# Patient Record
Sex: MALE | Race: BLACK OR AFRICAN AMERICAN
[De-identification: names, ages, dates, MRNs, and addresses within clinical notes are randomized per-mention and may not be internally consistent; named-entity substitution may affect disease eponyms.]

---

## 2018-01-05 ENCOUNTER — HOSPITAL ENCOUNTER (EMERGENCY)
Dept: HOSPITAL 92 - ERS | Age: 58
Discharge: HOME | End: 2018-01-05
Payer: MEDICARE

## 2018-01-05 DIAGNOSIS — Z79.82: ICD-10-CM

## 2018-01-05 DIAGNOSIS — Z79.899: ICD-10-CM

## 2018-01-05 DIAGNOSIS — I50.9: ICD-10-CM

## 2018-01-05 DIAGNOSIS — D50.9: ICD-10-CM

## 2018-01-05 DIAGNOSIS — I13.2: Primary | ICD-10-CM

## 2018-01-05 DIAGNOSIS — Z99.2: ICD-10-CM

## 2018-01-05 DIAGNOSIS — K21.9: ICD-10-CM

## 2018-01-05 DIAGNOSIS — M10.9: ICD-10-CM

## 2018-01-05 DIAGNOSIS — E78.00: ICD-10-CM

## 2018-01-05 DIAGNOSIS — E11.22: ICD-10-CM

## 2018-01-05 DIAGNOSIS — N18.6: ICD-10-CM

## 2018-01-05 LAB
ALBUMIN SERPL BCG-MCNC: 3.8 G/DL (ref 3.5–5)
ALP SERPL-CCNC: 106 U/L (ref 40–150)
ALT SERPL W P-5'-P-CCNC: 49 U/L (ref 8–55)
ANION GAP SERPL CALC-SCNC: 17 MMOL/L (ref 10–20)
ANISOCYTOSIS BLD QL SMEAR: (no result) (100X)
AST SERPL-CCNC: 74 U/L (ref 5–34)
BILIRUB SERPL-MCNC: 1.1 MG/DL (ref 0.2–1.2)
BUN SERPL-MCNC: 17 MG/DL (ref 8.4–25.7)
CALCIUM SERPL-MCNC: 9 MG/DL (ref 7.8–10.44)
CHLORIDE SERPL-SCNC: 95 MMOL/L (ref 98–107)
CK MB SERPL-MCNC: 2 NG/ML (ref 0–6.6)
CO2 SERPL-SCNC: 28 MMOL/L (ref 22–29)
CREAT CL PREDICTED SERPL C-G-VRATE: 0 ML/MIN (ref 70–130)
GLOBULIN SER CALC-MCNC: 4.2 G/DL (ref 2.4–3.5)
GLUCOSE SERPL-MCNC: 149 MG/DL (ref 70–105)
HGB BLD-MCNC: 13.7 G/DL (ref 14–18)
LIPASE SERPL-CCNC: 10 U/L (ref 8–78)
MCH RBC QN AUTO: 30.5 PG (ref 27–31)
MCV RBC AUTO: 89.3 FL (ref 80–94)
MDIFF COMPLETE?: YES
PLATELET # BLD AUTO: 185 THOU/UL (ref 130–400)
PLATELET BLD QL SMEAR: (no result)
POTASSIUM SERPL-SCNC: 3.8 MMOL/L (ref 3.5–5.1)
RBC # BLD AUTO: 4.48 MILL/UL (ref 4.7–6.1)
SODIUM SERPL-SCNC: 136 MMOL/L (ref 136–145)
TROPONIN I SERPL DL<=0.01 NG/ML-MCNC: 0.05 NG/ML (ref ?–0.03)
WBC # BLD AUTO: 8.1 THOU/UL (ref 4.8–10.8)

## 2018-01-05 PROCEDURE — 85025 COMPLETE CBC W/AUTO DIFF WBC: CPT

## 2018-01-05 PROCEDURE — 71045 X-RAY EXAM CHEST 1 VIEW: CPT

## 2018-01-05 PROCEDURE — 80053 COMPREHEN METABOLIC PANEL: CPT

## 2018-01-05 PROCEDURE — 84484 ASSAY OF TROPONIN QUANT: CPT

## 2018-01-05 PROCEDURE — 36415 COLL VENOUS BLD VENIPUNCTURE: CPT

## 2018-01-05 PROCEDURE — 93005 ELECTROCARDIOGRAM TRACING: CPT

## 2018-01-05 PROCEDURE — 82553 CREATINE MB FRACTION: CPT

## 2018-01-05 PROCEDURE — 83690 ASSAY OF LIPASE: CPT

## 2018-01-05 NOTE — RAD
PORTABLE CHEST ONE VIEW:

 

01/05/2018

 

1:03 p.m.

 

HISTORY:

Dizziness.  Shortness of breath.  Weakness.  Nausea.

 

FINDINGS:

There is elevation of the right hemidiaphragm.  The heart is enlarged.  No lobar consolidation, pneum
othorax, shiva pulmonary edema, or pleural effusion is seen.

 

POS: OFF

## 2018-01-06 NOTE — EKG
Test Reason : DIFFBREATHING

Blood Pressure : ***/*** mmHG

Vent. Rate : 081 BPM     Atrial Rate : 081 BPM

   P-R Int : 174 ms          QRS Dur : 082 ms

    QT Int : 446 ms       P-R-T Axes : 044 020 029 degrees

   QTc Int : 518 ms

 

Normal sinus rhythm

Prolonged QT

Abnormal ECG

 

Confirmed by BOBBI VILLASENOR, MITESH ([id number]),  CINDI ESPINOZA ([id number]) on 1/6/2018 3:38:37 PM

 

Referred By:             Confirmed By:MITESH MARTINES MD

## 2018-10-10 ENCOUNTER — HOSPITAL ENCOUNTER (INPATIENT)
Dept: HOSPITAL 92 - ERS | Age: 58
LOS: 6 days | Discharge: HOME | DRG: 291 | End: 2018-10-16
Attending: INTERNAL MEDICINE | Admitting: INTERNAL MEDICINE
Payer: MEDICARE

## 2018-10-10 VITALS — BODY MASS INDEX: 37 KG/M2

## 2018-10-10 DIAGNOSIS — E11.21: ICD-10-CM

## 2018-10-10 DIAGNOSIS — J96.01: ICD-10-CM

## 2018-10-10 DIAGNOSIS — R53.81: ICD-10-CM

## 2018-10-10 DIAGNOSIS — Z95.5: ICD-10-CM

## 2018-10-10 DIAGNOSIS — M19.90: ICD-10-CM

## 2018-10-10 DIAGNOSIS — E78.5: ICD-10-CM

## 2018-10-10 DIAGNOSIS — I13.0: Primary | ICD-10-CM

## 2018-10-10 DIAGNOSIS — I25.10: ICD-10-CM

## 2018-10-10 DIAGNOSIS — K21.9: ICD-10-CM

## 2018-10-10 DIAGNOSIS — F32.9: ICD-10-CM

## 2018-10-10 DIAGNOSIS — E87.6: ICD-10-CM

## 2018-10-10 DIAGNOSIS — Z79.82: ICD-10-CM

## 2018-10-10 DIAGNOSIS — I50.33: ICD-10-CM

## 2018-10-10 DIAGNOSIS — I08.1: ICD-10-CM

## 2018-10-10 DIAGNOSIS — Z99.2: ICD-10-CM

## 2018-10-10 DIAGNOSIS — K59.09: ICD-10-CM

## 2018-10-10 DIAGNOSIS — M10.9: ICD-10-CM

## 2018-10-10 DIAGNOSIS — N18.6: ICD-10-CM

## 2018-10-10 DIAGNOSIS — E11.22: ICD-10-CM

## 2018-10-10 DIAGNOSIS — D50.9: ICD-10-CM

## 2018-10-10 DIAGNOSIS — Z79.899: ICD-10-CM

## 2018-10-10 DIAGNOSIS — J18.9: ICD-10-CM

## 2018-10-10 DIAGNOSIS — B19.20: ICD-10-CM

## 2018-10-10 LAB
ALBUMIN SERPL BCG-MCNC: 3.8 G/DL (ref 3.5–5)
ALP SERPL-CCNC: 64 U/L (ref 40–150)
ALT SERPL W P-5'-P-CCNC: 7 U/L (ref 8–55)
ANION GAP SERPL CALC-SCNC: 12 MMOL/L (ref 10–20)
AST SERPL-CCNC: 16 U/L (ref 5–34)
BASOPHILS # BLD AUTO: 0 THOU/UL (ref 0–0.2)
BASOPHILS NFR BLD AUTO: 1 % (ref 0–1)
BILIRUB SERPL-MCNC: 1.6 MG/DL (ref 0.2–1.2)
BUN SERPL-MCNC: 7 MG/DL (ref 8.4–25.7)
CALCIUM SERPL-MCNC: 9.1 MG/DL (ref 7.8–10.44)
CHLORIDE SERPL-SCNC: 96 MMOL/L (ref 98–107)
CK MB SERPL-MCNC: 1 NG/ML (ref 0–6.6)
CK SERPL-CCNC: 123 U/L (ref 30–200)
CO2 SERPL-SCNC: 33 MMOL/L (ref 22–29)
CREAT CL PREDICTED SERPL C-G-VRATE: 0 ML/MIN (ref 70–130)
EOSINOPHIL # BLD AUTO: 0.4 THOU/UL (ref 0–0.7)
EOSINOPHIL NFR BLD AUTO: 11.1 % (ref 0–10)
GLOBULIN SER CALC-MCNC: 3.9 G/DL (ref 2.4–3.5)
GLUCOSE SERPL-MCNC: 93 MG/DL (ref 70–105)
HGB BLD-MCNC: 10.2 G/DL (ref 14–18)
LYMPHOCYTES # BLD: 0.8 THOU/UL (ref 1.2–3.4)
LYMPHOCYTES NFR BLD AUTO: 21 % (ref 21–51)
MCH RBC QN AUTO: 28 PG (ref 27–31)
MCV RBC AUTO: 86.1 FL (ref 78–98)
MONOCYTES # BLD AUTO: 0.3 THOU/UL (ref 0.11–0.59)
MONOCYTES NFR BLD AUTO: 7.7 % (ref 0–10)
NEUTROPHILS # BLD AUTO: 2.4 THOU/UL (ref 1.4–6.5)
NEUTROPHILS NFR BLD AUTO: 59.1 % (ref 42–75)
PLATELET # BLD AUTO: 242 THOU/UL (ref 130–400)
POTASSIUM SERPL-SCNC: 2.7 MMOL/L (ref 3.5–5.1)
RBC # BLD AUTO: 3.66 MILL/UL (ref 4.7–6.1)
SODIUM SERPL-SCNC: 138 MMOL/L (ref 136–145)
TROPONIN I SERPL DL<=0.01 NG/ML-MCNC: 0.02 NG/ML (ref ?–0.03)
WBC # BLD AUTO: 4 THOU/UL (ref 4.8–10.8)

## 2018-10-10 PROCEDURE — 80400 ACTH STIMULATION PANEL: CPT

## 2018-10-10 PROCEDURE — 87086 URINE CULTURE/COLONY COUNT: CPT

## 2018-10-10 PROCEDURE — 36415 COLL VENOUS BLD VENIPUNCTURE: CPT

## 2018-10-10 PROCEDURE — 84484 ASSAY OF TROPONIN QUANT: CPT

## 2018-10-10 PROCEDURE — 80202 ASSAY OF VANCOMYCIN: CPT

## 2018-10-10 PROCEDURE — G0257 UNSCHED DIALYSIS ESRD PT HOS: HCPCS

## 2018-10-10 PROCEDURE — 81001 URINALYSIS AUTO W/SCOPE: CPT

## 2018-10-10 PROCEDURE — 83880 ASSAY OF NATRIURETIC PEPTIDE: CPT

## 2018-10-10 PROCEDURE — 80048 BASIC METABOLIC PNL TOTAL CA: CPT

## 2018-10-10 PROCEDURE — 87040 BLOOD CULTURE FOR BACTERIA: CPT

## 2018-10-10 PROCEDURE — 80053 COMPREHEN METABOLIC PANEL: CPT

## 2018-10-10 PROCEDURE — 5A1D70Z PERFORMANCE OF URINARY FILTRATION, INTERMITTENT, LESS THAN 6 HOURS PER DAY: ICD-10-PCS | Performed by: INTERNAL MEDICINE

## 2018-10-10 PROCEDURE — 71045 X-RAY EXAM CHEST 1 VIEW: CPT

## 2018-10-10 PROCEDURE — 82553 CREATINE MB FRACTION: CPT

## 2018-10-10 PROCEDURE — 96365 THER/PROPH/DIAG IV INF INIT: CPT

## 2018-10-10 PROCEDURE — 90935 HEMODIALYSIS ONE EVALUATION: CPT

## 2018-10-10 PROCEDURE — 93005 ELECTROCARDIOGRAM TRACING: CPT

## 2018-10-10 PROCEDURE — 85025 COMPLETE CBC W/AUTO DIFF WBC: CPT

## 2018-10-10 PROCEDURE — 93798 PHYS/QHP OP CAR RHAB W/ECG: CPT

## 2018-10-10 PROCEDURE — 36416 COLLJ CAPILLARY BLOOD SPEC: CPT

## 2018-10-10 PROCEDURE — 93306 TTE W/DOPPLER COMPLETE: CPT

## 2018-10-10 RX ADMIN — HEPARIN SODIUM SCH UNITS: 5000 INJECTION, SOLUTION INTRAVENOUS; SUBCUTANEOUS at 21:07

## 2018-10-10 NOTE — HP
DATE OF ADMISSION:  10/10/2018

 

CHIEF COMPLAINT:  Generalized weakness and shortness of breath.

 

PRIMARY CARE PHYSICIAN:  Emilia.

 

PRIMARY NEPHROLOGIST:  Dr. Posadas.

 

HISTORY OF PRESENTING ILLNESS:  Mr. Scott is a very pleasant 58-year-old male with known history of en
d-stage renal disease on hemodialysis as well as history of chronic diastolic congestive heart failur
e, chronic hepatitis C, iron deficiency anemia, hypertension, and diabetes mellitus who presented to 
the ER from dialysis center with the above-mentioned complaint.  History is mainly obtained by the christina molina himself and electronic medical records have been reviewed.  [facility]

 

According to Mr. Scott, he has been feeling poorly for the last 3 weeks with increased fatigue and pro
gressively worsening shortness of breath.  He has been having some subjective fevers with chills.  He
 has a dry cough.  Denies any chest pain, orthopnea or PND.  Denies any swelling.  Denies any nausea,
 vomiting, diarrhea.  He has been compliant with his dialysis and medications.  He denies any sick co
ntacts.  He is normally able to ambulate freely in the house and is independent with his ADLs and IAD
Ls, but lately has been having a hard time walking around the house because of excessive weakness.  HUMPHREY alan did receive his influenza vaccination earlier this month.

 

In the hemodialysis center today, he was found to be hypoxic with oxygen saturation in low 80s, so he
 was sent to the emergency room.  His oxygen saturation in the ER was 94% on room air.  A chest x-ray
 done in the ER was suggestive of possible bibasilar atelectasis versus pneumonia and some fluid over
load.  His BNP was elevated to 2642.  Cardiac enzymes were negative.  He was started on empiric IV an
tibiotics and is now being admitted for further evaluation and care of acute hypoxic respiratory fail
ure with possible pneumonia and fluid overload.  His potassium was also found to be low at 2.7.

 

PAST MEDICAL HISTORY:

1.  End-stage renal disease on hemodialysis started last year in 2017.

2.  Hypertension.

3.  Dyslipidemia.

4.  Diabetes mellitus type 2.

5.  Chronic diastolic congestive heart failure.

6.  Chronic hepatitis C.

7.  Gout.

 

PAST SURGICAL HISTORY:  Angioplasty and cataracts.

 

PSYCHIATRIC HISTORY:  None are reviewed with the patient.

 

SOCIAL HISTORY:  He lives alone and is independent.  No history of drug, tobacco or alcohol abuse.  Q
uit smoking 30 years ago.

 

FAMILY HISTORY:  Hypertension and coronary artery disease.

 

ALLERGIES:  No known medication allergies.

 

HOME MEDICATIONS:  As listed in the [employer], aspirin 81 mg daily, allopurinol 300 mg daily, nifedipi
ne 90 mg daily, Imdur 60 mg p.o. b.i.d., Lantus 8 units in the morning, ferrous sulfate 325 mg daily,
 Nexium 40 mg daily, Coreg 25 t.i.d., Lipitor 10 at bedtime, hydralazine 25 t.i.d., MiraLax daily, an
d omega 3 fish oil daily.

 

REVIEW OF SYSTEMS:  A 12-point review of systems is done, it is negative except for those mentioned i
n the history and physical.

 

LABORATORY DATA AND IMAGING DATA:  CBC shows WBCs at 4.0 without any left shift or bandemia, hemoglob
in 10.2, platelet count of 242.  Serum chemistry shows potassium of 2.7, chloride low at 96, bicarbon
ate high at 33, BUN 7, creatinine 2.19.  Troponin 0.017 with normal CK-MB, BNP 2642.  Chest x-ray by 
my review showed bibasilar opacities consistent with possible development of early pneumonia as well 
as mild pulmonary vascular congestion.

 

PHYSICAL EXAMINATION:

VITAL SIGNS:  Most recent vital signs; temperature 97.3, pulse of 63, respirations 20, saturating 92%
 on 2 liters nasal cannula, blood pressure 183/65.

GENERAL:  No acute distress.  He does appear somewhat sick and weak.

HEENT:  Mucous membrane is moist and pink.  No oropharyngeal exudate or erythema.  Head is normocepha
lic, atraumatic.  Pupils equal, reactive to light and accommodation.  Extraocular movement intact.

NECK:  Supple without any lymphadenopathy, JVD or bruit.

CHEST:  Clear to auscultation except for some few bibasilar crackles.  Rate and rhythm is regular wit
hout any murmur, rubs or gallops.

ABDOMEN:  Soft, nontender, nondistended with positive bowel sounds.

EXTREMITIES:  Free of any cyanosis, clubbing, or edema.

NEUROLOGIC:  Examination is nonfocal.

SKIN:  Free of any rashes or bruises.  Feels warm and dry to touch.

PSYCHIATRIC:  Normal affect.

 

IMPRESSION AND PLAN:

1.  Acute hypoxic respiratory failure.  Appears multifactorial at this time.  Pneumonia cannot be rul
ed out as well as acute exacerbation of chronic diastolic heart failure even though the patient was c
ompliant with his hemodialysis.  We will start him on empiric antibiotics.  Unfortunately, he has los
t IV access and despite multiple attempts, it was unsuccessful to be established.  It will be tried a
gain tomorrow.  He will be started and continued on empiric oral antibiotics in the meanwhile for kenny
al adjusted dose.  Blood cultures have been sent from the emergency room, and we will follow the resu
lts.  Cardiac enzyme is unremarkable, so suspicion of ACS is very low at this time.  Hemodialysis wit
h fluid removal will be done as tolerated.  We will check BMP again in the morning.  Repeat echocardi
ogram will be ordered as well.  His last echo was done in 01/2016, which showed a large pleural effus
ion, normal left ventricular systolic function, moderate tricuspid and mitral regurgitation.  He will
 be treated with symptomatic and supportive care with supplemental oxygen as well.

2.  Hypokalemia, it will be replaced and rechecked.  Dialysis supplementation as per nephrologist aft
erwards

3.  End-stage renal disease on hemodialysis.  We will consult Nephrology for maintenance hemodialysis
 in the hospital.

4.  History of hypertension.  We will restart his Procardia, carvedilol, hydralazine and Imdur and mo
nitor symptoms.  P.r.n. antihypertensives.

5.  Diabetes mellitus type 2.  Restart his Lantus and add insulin sliding scale with frequent Accu-Ch
eks.

6.  Generalized weakness.  We will have OT/PT to evaluate him.

7.  Dyslipidemia.  We will restart his Lipitor.

8.  History of chronic hepatitis C.

9.  Chronic constipation.  Restart MiraLax.

10.  Deep venous thrombosis and gastrointestinal prophylaxis.

11.  P.r.n. medications.

 

DISPOSITION:  Mr. Scott is currently being admitted to the hospital with acute hypoxic respiratory altagracia
lure likely secondary to pneumonia and fluid overload from acute on chronic diastolic congestive hear
t failure.  Estimated length of stay at this time is at least 2-3 midnights.  Further management will
 depend upon his clinical course.

## 2018-10-10 NOTE — RAD
RADIOGRAPH CHEST 1 VIEW:

 

Date:  10/10/2018

Time:  9:22 a.m.

 

HISTORY:

A 58-year-old male with dyspnea and malaise.

 

COMPARISON:

01/05/2018

 

FINDINGS:

Mild cardiomegaly.  Nonspecific mild pulmonary densities, especially at the lower lung zones and jeni
cially in the retrocardiac portion of the left lower lobe.  The attenuation of the left lower lobe ap
pears to be somewhat greater than on the prior study.  No pneumothorax.  There is a new finding of pa
rtial silhouetting of the left hemidiaphragm and blunting of the left lateral costophrenic angle.

 

IMPRESSION:

1.  Nonspecific mildly increased attenuation in the bilateral lower lung zones, which could be mild p
ulmonary interstitial edema.

 

2.  Increased attenuation in the left lower lobe, which could be mild atelectasis or early pneumonia.


 

3.  Suspected small left pleural effusion.

 

LINDY []

 

POS: KATALINA

## 2018-10-11 LAB
ANION GAP SERPL CALC-SCNC: 14 MMOL/L (ref 10–20)
BASOPHILS # BLD AUTO: 0 THOU/UL (ref 0–0.2)
BASOPHILS NFR BLD AUTO: 0.4 % (ref 0–1)
BUN SERPL-MCNC: 14 MG/DL (ref 8.4–25.7)
CALCIUM SERPL-MCNC: 8.9 MG/DL (ref 7.8–10.44)
CHLORIDE SERPL-SCNC: 98 MMOL/L (ref 98–107)
CO2 SERPL-SCNC: 25 MMOL/L (ref 22–29)
CREAT CL PREDICTED SERPL C-G-VRATE: 32 ML/MIN (ref 70–130)
CRYSTAL-AUWI FLAG: 0.7 (ref 0–15)
EOSINOPHIL # BLD AUTO: 0.6 THOU/UL (ref 0–0.7)
EOSINOPHIL NFR BLD AUTO: 16 % (ref 0–10)
GLUCOSE SERPL-MCNC: 102 MG/DL (ref 70–105)
HEV IGM SER QL: 4.7 (ref 0–7.99)
HGB BLD-MCNC: 10.1 G/DL (ref 14–18)
HYALINE CASTS #/AREA URNS LPF: (no result) LPF
LYMPHOCYTES # BLD: 0.8 THOU/UL (ref 1.2–3.4)
LYMPHOCYTES NFR BLD AUTO: 20.9 % (ref 21–51)
MCH RBC QN AUTO: 28.8 PG (ref 27–31)
MCV RBC AUTO: 86.6 FL (ref 78–98)
MONOCYTES # BLD AUTO: 0.2 THOU/UL (ref 0.11–0.59)
MONOCYTES NFR BLD AUTO: 5.5 % (ref 0–10)
NEUTROPHILS # BLD AUTO: 2.2 THOU/UL (ref 1.4–6.5)
NEUTROPHILS NFR BLD AUTO: 57.1 % (ref 42–75)
PATHC CAST-AUWI FLAG: 1.16 (ref 0–2.49)
PLATELET # BLD AUTO: 207 THOU/UL (ref 130–400)
POTASSIUM SERPL-SCNC: 4.1 MMOL/L (ref 3.5–5.1)
PROT UR STRIP.AUTO-MCNC: 300 MG/DL
RBC # BLD AUTO: 3.51 MILL/UL (ref 4.7–6.1)
RBC UR QL AUTO: (no result) HPF (ref 0–3)
SODIUM SERPL-SCNC: 133 MMOL/L (ref 136–145)
SP GR UR STRIP: 1.02 (ref 1–1.04)
SPERM-AUWI FLAG: 0 (ref 0–9.9)
WBC # BLD AUTO: 3.8 THOU/UL (ref 4.8–10.8)
WBC UR QL AUTO: (no result) HPF (ref 0–3)
YEAST-AUWI FLAG: 0 (ref 0–25)

## 2018-10-11 RX ADMIN — ASPIRIN SCH MG: 81 TABLET ORAL at 08:13

## 2018-10-11 RX ADMIN — INSULIN GLARGINE SCH MLS: 100 INJECTION, SOLUTION SUBCUTANEOUS at 08:11

## 2018-10-11 RX ADMIN — NIFEDIPINE SCH MG: 90 TABLET, EXTENDED RELEASE ORAL at 08:11

## 2018-10-11 RX ADMIN — HEPARIN SODIUM SCH UNITS: 5000 INJECTION, SOLUTION INTRAVENOUS; SUBCUTANEOUS at 08:14

## 2018-10-11 RX ADMIN — HEPARIN SODIUM SCH UNITS: 5000 INJECTION, SOLUTION INTRAVENOUS; SUBCUTANEOUS at 21:01

## 2018-10-11 NOTE — PRG
DATE OF SERVICE:  10/11/2018

 

SUBJECTIVE:  Mr. Scott is a 58-year-old black male who was admitted for generalized weakness.  Accordi
ng to the patient, he has been feeling tired for the last 2-3 weeks.  He has essentially no energy.  
In addition, his appetite is much decreased.  He was admitted for possible sepsis.  He has been empir
ically treated also with IV antibiotics.  A cardiac echo has also been ordered.  Denies any overt josué
rtness of breath.

 

Chest x-ray did suggest some fluid as well as an elevated BNP.

 

PHYSICAL EXAMINATION: 

VITAL SIGNS:  Blood pressure is 185/83, heart rate 75, respiratory rate 20, temperature 98, pulse ox 
91%.

GENERAL:  Noted to be awake, alert, but somewhat lethargic, not in overt distress.

SKIN:  Adequate turgor. 

HEENT:  He has slightly pale conjunctivae, anicteric sclerae.

NECK:  No neck mass, no carotid bruits, no JVD.

CHEST:  No deformities.

LUNGS:  Clear.  Decreased breath sounds.

HEART:  Normal sinus rhythm.  No murmur, no gallops, no rubs.

ABDOMEN:  Globular, soft, nontender, no masses.

EXTREMITIES:  No edema, no deformities.

 

MEDICATIONS:  10/11/2018 - Reviewed.

 

LABORATORY:  10/11/2018 -  Sodium 133, potassium 4.1, chloride 98, carbon dioxide 25, BUN 14, creatin
ine 3.25, glucose 102, calcium 8.9.  White count 3.8, hemoglobin 10.1. 

 

ASSESSMENT AND PLAN:  

1.  Generalized weakness/elevated BNP - cardiac echo ordered.  We will recheck what the ejection frac
tion is.  The generalized weakness may be from underlying decreased EF

2.  End-stage renal disease, stable.  We will continue current hemodialysis regimen Monday, Wednesday
, Friday.  No indication for any emergent dialysis today.

3.  Borderline anemia.  We will continue to observe.  Please note we will order a Cortrosyn stimulati
on test for this patient.

 

Recheck base met and CBC in the a.m.

## 2018-10-11 NOTE — PDOC.PN
- Subjective


Encounter Start Date: 10/11/18


Encounter Start Time: 09:00





Pt seen for followup re; acute hypoxic respiratory failure.  Feels slightly 

better, but still weak.  





- Objective


Vital Signs & Weight: 


 Vital Signs (12 hours)











  Temp Pulse Resp BP Pulse Ox


 


 10/11/18 17:00  98.0 F  74  20  144/66 H  97


 


 10/11/18 15:33   69   


 


 10/11/18 11:43  98.3 F  69  20  186/71 H  94 L


 


 10/11/18 08:12   66   


 


 10/11/18 08:11   66   


 


 10/11/18 08:00  98.0 F  75  20  185/83 H  94 L








 Weight











Admit Weight                   204 lb 9 oz


 


Weight                         204 lb 9 oz














I&O: 


 











 10/10/18 10/11/18 10/12/18





 06:59 06:59 06:59


 


Intake Total  840 240


 


Output Total  50 


 


Balance  790 240











Result Diagrams: 


 10/11/18 04:12





 10/11/18 04:12


Additional Labs: 


 Accuchecks











  10/11/18 10/11/18 10/11/18





  17:03 11:40 05:25


 


POC Glucose  82  92  105














  10/10/18





  21:03


 


POC Glucose  100














Phys Exam





- Physical Examination


Obese


HEENT: moist MMs, sclera anicteric, oral pharynx no lesions, 2+ tonsils


Neck: no nodes, no JVD, supple, full ROM


Respiratory: no wheezing, no rales, no rhonchi, clear to auscultation bilateral


Cardiovascular: RRR, no rub


S1, s2


Gastrointestinal: soft, non-tender, no distention, positive bowel sounds


Neurological: moves all 4 limbs


Psychiatric: normal affect





Dx/Plan


(1) Acute respiratory failure with hypoxia


Code(s): J96.01 - ACUTE RESPIRATORY FAILURE WITH HYPOXIA   Status: Acute   

Comment: Slightly improved, continues to be on supplemental oxygen.  Suspected 

sepsis, continue antibiotics as below and follow cultures.   





(2) ESRD (end stage renal disease) on dialysis


Code(s): N18.6 - END STAGE RENAL DISEASE; Z99.2 - DEPENDENCE ON RENAL DIALYSIS 

  Status: Chronic   Comment: dialysis per nephrology service   





(3) Gout


Code(s): M10.9 - GOUT, UNSPECIFIED   Status: Chronic   


Qualifiers: 


   Gout site: unspecified site   Gout etiology: unspecified cause   Chronicity: 

chronic   Presence of tophus: without tophus   Qualified Code(s): M1A.9XX0 - 

Chronic gout, unspecified, without tophus (tophi)   


Comment: stable   





(4) Hypertension


Code(s): I10 - ESSENTIAL (PRIMARY) HYPERTENSION   Status: Chronic   Comment: 

start PRN IV hydralazine for blood pressure spikes   





(5) Chronic hepatitis C


Code(s): B18.2 - CHRONIC VIRAL HEPATITIS C   Status: Chronic   Comment: stable 

  





- Plan





* .








Review of Systems





- Review of Systems


Constitutional: chills, weakness.  negative: fever, sweats, malaise


Respiratory: Cough, Dry, SOB with Excertion.  negative: Shortness of Breath, 

Hemoptysis, Pleuritic Pain, Sputum, Wheezing


Cardiovascular: negative: chest pain, palpitations, orthopnea, paroxysmal 

nocturnal dyspnea, edema, light headedness


Gastrointestinal: negative: Nausea, Vomiting, Abdominal Pain, Diarrhea, 

Constipation, Melena, Hematochezia


Genitourinary: negative: Dysuria, Frequency, Incontinence, Hematuria, Retention


Skin: negative: Rash, Lesions, Jae, Bruising





- Medications/Allergies


Allergies/Adverse Reactions: 


 Allergies











Allergy/AdvReac Type Severity Reaction Status Date / Time


 


No Known Drug Allergies Allergy   Verified 11/02/15 08:35











Medications: 


 Current Medications





Acetaminophen (Tylenol)  650 mg PO Q4H PRN


   PRN Reason: Headache/Fever/Mild Pain (1-3)


Hydrocodone Bitart/Acetaminophen (Norco 5/325)  1 tab PO Q4H PRN


   PRN Reason: Moderate Pain (4-6)


   Last Admin: 10/10/18 15:23 Dose:  1 tab


Allopurinol (Zyloprim)  300 mg PO DAILY [facility]


   Last Admin: 10/11/18 08:12 Dose:  300 mg


Aspirin (Ecotrin)  81 mg PO DAILY [facility]


   Last Admin: 10/11/18 08:13 Dose:  81 mg


Atorvastatin Calcium (Lipitor)  10 mg PO HS [facility]


   Last Admin: 10/10/18 21:07 Dose:  10 mg


Benzonatate (Tessalon)  100 mg PO Q4H PRN


   PRN Reason: Cough


Bisacodyl (Dulcolax)  10 mg PO DAILYPRN PRN


   PRN Reason: Constipation


Bisacodyl (Dulcolax)  10 mg [place] DAILYPRN PRN


   PRN Reason: Constipation


Calcium Carbonate (Tums)  1,000 mg PO Q4H PRN


   PRN Reason: Heartburn  or Indigestion


Carvedilol (Coreg)  25 mg PO TID [facility]


   Last Admin: 10/11/18 15:33 Dose:  25 mg


Clonidine (Catapres)  0.1 mg PO Q4H PRN


   PRN Reason: Systolic BP > 160


Cosyntropin (Cortrosyn)  250 mcg SLOW IVP WILLCALL [facility]


   Last Admin: 10/11/18 15:32 Dose:  250 mcg


Doxycycline Hyclate (Vibramycin)  100 mg PO BID [facility]


   Last Admin: 10/11/18 08:11 Dose:  100 mg


Famotidine (Pepcid)  20 mg PO DAILY [facility]


   Last Admin: 10/11/18 08:12 Dose:  20 mg


[facility]errous Sulfate (Feosol)  [facility]


   Last Admin: 10/11/18 08:12 Dose:  325 mg


Fish Oil (Fish Oil)  1,000 mg PO BID [facility]


   Last Admin: 10/11/18 08:13 Dose:  1,000 mg


Guaifenesin (Robitussin Sf)  200 mg PO Q4H PRN


   PRN Reason: Cough


Heparin Sodium (Porcine) (Heparin)  5,000 units SC BID [facility]


   Last Admin: 10/11/18 08:14 Dose:  5,000 units


Hydralazine HCl (Apresoline)  10 mg SLOW IVP Q4H PRN


   PRN Reason: Systolic BP > 170


Hydralazine HCl (Apresoline)  25 mg PO TID [facility]


   Last Admin: 10/11/18 15:33 Dose:  25 mg


Vancomycin HCl 1.25 gm/ Sodium (Chloride)  250 mls @ 166.667 mls/hr IVPB 

WILLCALL [facility]


Vancomycin HCl 1 gm/ Device  200 mls @ 200 mls/hr IVPB WILLCALL [facility]


Vancomycin HCl 750 mg/ Sodium (Chloride)  250 mls @ 250 mls/hr [facility]


Insulin Glargine 8 units/ (Miscellaneous Medication)  0.08 mls @ 0 mls/hr SC 

QAM [facility]


   Last Admin: 10/11/18 08:11 Dose:  0.08 mls


Isosorbide Mononitrate (Imdur)  60 mg PO BID [facility]


   Last Admin: 10/11/18 08:13 Dose:  60 mg


Levofloxacin (Levaquin)  250 mg PO 0600 [facility]


   Last Admin: 10/11/18 05:37 Dose:  250 mg


Loratadine (Claritin)  10 mg PO DAILYPRN PRN


   PRN Reason: Sinus Symptoms


Miscellaneous Medication (Pharmacy To Dose)  1 each IVPB PRN PRN


   PRN Reason: Pharmacy to dose


Nifedipine (Procardia Xl)  90 mg PO DAILY [facility]


   Last Admin: 10/11/18 08:11 Dose:  90 mg


Nitroglycerin (Nitrostat)  0.4 mg SL Q5MIN PRN


   PRN Reason: Chest Pain


Hold Vancomycin For (Level >20)  0 each FS .AT DIALYSIS [facility]


Ondansetron HCl (Zofran)  4 mg IVP Q6H PRN


   PRN Reason: Nausea/Vomiting


Pantoprazole Sodium (Protonix)  40 mg PO DAILY [facility]


   Last Admin: 10/11/18 08:12 Dose:  40 mg


Polyethylene Glycol (Miralax)  17 gm PO DAILY [facility]


   Last Admin: 10/11/18 08:11 Dose:  17 gm


Senna (Senokot)  2 tab PO HSPRN PRN


   PRN Reason: Constipation


Senna/Docusate Sodium (Senokot S)  2 tab PO BID PRN


   PRN Reason: Constipation


Sodium Chloride (Flush - Normal Saline)  10 ml IVF PRN PRN


   PRN Reason: Saline Flush


Tramadol HCl (Ultram)  50 mg PO Q4H PRN


   PRN Reason: Moderate Pain (4-6)

## 2018-10-11 NOTE — CON
DATE OF CONSULTATION:  10/10/2018

 

HISTORY OF PRESENT ILLNESS:  Mr. Scott is a 58-year-old black male with ESRD from diabetic nephropathy
, on maintenance hemodialysis, and admitted for generalized weakness.  The patient was undergoing shabana
lysis, complaining of generalized weakness and mild shortness of breath.  A CBC was checked and his h
emoglobin was noted to be acceptable.  He was seen today for further management.  The patient was com
plaining of some chills at home.  For that reason, he was admitted for possible sepsis.

 

Chest x-ray was suggestive of possible pneumonia versus fluid overload.  His BNP was elevated 2642.  
He had also a low potassium, but this is a post-dialysis number.

 

REVIEW OF SYSTEMS:  Positive for generalized malaise.  Positive for chills, but no fever, no diarrhea
, no constipation, no dysuria, no urinary frequency, no syncopal episode, no productive cough.  Appet
ite and energy level is decreased.

 

MEDICATIONS:  The patient is currently on Norco 5/325 q.6 hours p.r.n., Zyloprim 300 mg daily, aspiri
n 81 mg tab daily, Lipitor 10 mg at bedtime, Tessalon 100 mg q.4 hours, Tums 1000 mg q.4 hours, Coreg
 25 mg p.o. t.i.d., clonidine p.r.n., doxycycline 100 mg p.o. b.i.d., ferrous sulfate 325 mg q.a.m., 
fish oil 1000 mg p.o. b.i.d., heparin 5000 units subcutaneously b.i.d., insulin glargine 8 units subc
u q.a.m., Imdur 60 mg p.o. b.i.d., Levaquin 250 mg daily, nifedipine 90 mg XL tab daily, Nitrostat p.
r.n., status post vancomycin, Protonix 40 mg tab once a day.

 

PAST MEDICAL HISTORY:

1.  Patient has history of ESRD from diabetic nephropathy.  Had been currently on maintenance hemodia
lysis.

2.  He has type 2 diabetes mellitus.

3.  Status post CHF.

4.  Coronary artery disease.

5.  Chronic anemia.

6.  Hyperlipidemia.

7.  Gout.

8.  Hypertension.

9.  GERD.

10.  Depression.

 

PAST SURGICAL HISTORY:

1.  Status post AV fistula placement.

2.  Status post cuffed hemodialysis catheter placement.

3.  Status post cardiac catheterization with coronary stent placement.

4.  Status post colonoscopy.

5.  Status post eye surgery.

 

SOCIAL HISTORY:  The patient lives in [place].  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  He is original
ly from [place].  He has 6 children.  Education:  GED.  Smoked for 20 years, one pack a day.  Currently,
 no history of smoking.  Status post multiple blood transfusions.  No alcohol use.

 

ALLERGIES:  None.

 

TRAUMA:  None.

 

IMMUNIZATIONS:  Up to date.

 

HOSPITALIZATIONS:  Please see past medical history.

 

FAMILY HISTORY:  No family history of ESRD.

 

PHYSICAL EXAMINATION:

VITAL SIGNS:  Blood pressure is noted at 141/62, heart rate 68, respiratory rate 20, temperature 97.7
, pulse ox 92%.

GENERAL:  Awake, alert, comfortable, lethargic, not in overt distress.

SKIN:  Adequate turgor.

HEENT:  He has pink, slightly pale conjunctivae, anicteric sclerae.

NECK:  No neck mass, no carotid bruits, no JVD.

CHEST:  No deformities.

LUNGS:  Decreased breath sounds.

HEART:  Normal sinus rhythm.  No murmur, no gallops, no rubs.

ABDOMEN:  Globular, soft, nontender, no masses.

EXTREMITIES:  No edema, no deformities.

NEUROLOGIC:  Moving all extremities.  No tremors, no asterixis, no ataxia.

 

LABORATORY AND DIAGNOSTIC DATA:  On 10/10/2018, white count 4, hemoglobin 10.2.  Sodium 138, potassiu
m 2.7, chloride 96, carbon dioxide 33, BUN 7, creatinine 2.19, glucose 93, AST 16, ALT 7.  .  B
NP is 2642, albumin 3.8, troponin I 0.019.  Chest x-ray of 10/10/2018, shows a small left pleural eff
usion; atelectasis, left lower lobe, increased interstitial lung markings.

 

ASSESSMENT AND PLAN:

1.  Generalized weakness/chills.  The patient being ruled out for possible infection.  Empiric antibi
otics have been started with this patient, having doxycycline, Levaquin, and IV vancomycin.

2.  End-stage renal disease, stable.  We will continue current Monday, Wednesday, Friday hemodialysis
.  Please note he received his dialysis today.  Fluid removal was tolerated.  If needed, we can do an
 extra dialysis tomorrow for fluid removal with this patient.  Overall, Kt/V suggests he is adequatel
y dialyzed with the current dialysis regimen.

3.  We will check base met and CBC in a.m.

## 2018-10-12 LAB
ANION GAP SERPL CALC-SCNC: 12 MMOL/L (ref 10–20)
BASOPHILS # BLD AUTO: 0 THOU/UL (ref 0–0.2)
BASOPHILS NFR BLD AUTO: 0.8 % (ref 0–1)
BUN SERPL-MCNC: 11 MG/DL (ref 8.4–25.7)
CALCIUM SERPL-MCNC: 9.5 MG/DL (ref 7.8–10.44)
CHLORIDE SERPL-SCNC: 99 MMOL/L (ref 98–107)
CO2 SERPL-SCNC: 30 MMOL/L (ref 22–29)
CREAT CL PREDICTED SERPL C-G-VRATE: 36 ML/MIN (ref 70–130)
EOSINOPHIL # BLD AUTO: 0.6 THOU/UL (ref 0–0.7)
EOSINOPHIL NFR BLD AUTO: 13.8 % (ref 0–10)
GLUCOSE SERPL-MCNC: 120 MG/DL (ref 70–105)
HGB BLD-MCNC: 11.4 G/DL (ref 14–18)
LYMPHOCYTES # BLD: 0.7 THOU/UL (ref 1.2–3.4)
LYMPHOCYTES NFR BLD AUTO: 17.7 % (ref 21–51)
MCH RBC QN AUTO: 28 PG (ref 27–31)
MCV RBC AUTO: 86.5 FL (ref 78–98)
MONOCYTES # BLD AUTO: 0.3 THOU/UL (ref 0.11–0.59)
MONOCYTES NFR BLD AUTO: 8.5 % (ref 0–10)
NEUTROPHILS # BLD AUTO: 2.4 THOU/UL (ref 1.4–6.5)
NEUTROPHILS NFR BLD AUTO: 59.2 % (ref 42–75)
PLATELET # BLD AUTO: 228 THOU/UL (ref 130–400)
POTASSIUM SERPL-SCNC: 3.2 MMOL/L (ref 3.5–5.1)
RBC # BLD AUTO: 4.06 MILL/UL (ref 4.7–6.1)
SODIUM SERPL-SCNC: 138 MMOL/L (ref 136–145)
WBC # BLD AUTO: 4 THOU/UL (ref 4.8–10.8)

## 2018-10-12 RX ADMIN — HEPARIN SODIUM SCH UNITS: 5000 INJECTION, SOLUTION INTRAVENOUS; SUBCUTANEOUS at 20:08

## 2018-10-12 RX ADMIN — ASPIRIN SCH: 81 TABLET ORAL at 11:53

## 2018-10-12 RX ADMIN — HEPARIN SODIUM SCH: 5000 INJECTION, SOLUTION INTRAVENOUS; SUBCUTANEOUS at 11:53

## 2018-10-12 RX ADMIN — INSULIN GLARGINE SCH: 100 INJECTION, SOLUTION SUBCUTANEOUS at 11:53

## 2018-10-12 RX ADMIN — NIFEDIPINE SCH: 90 TABLET, EXTENDED RELEASE ORAL at 11:54

## 2018-10-12 NOTE — PDOC.PN
- Subjective


Encounter Start Date: 10/12/18


Encounter Start Time: 10:20





Pt seen for followup re: acute hypoxic respiratory failure.  Denies chest pain.

  c/o generalized weakness.





- Objective


MAR Reviewed: Yes


Vital Signs & Weight: 


 Vital Signs (12 hours)











  Pulse BP Pulse Ox


 


 10/12/18 14:56   190/69 H 


 


 10/12/18 12:32   190/69 H 


 


 10/12/18 11:54  69  144/67 H 


 


 10/12/18 11:53  69  144/67 H 


 


 10/12/18 08:00    94 L








 Weight











Admit Weight                   204 lb 9 oz


 


Weight                         204 lb 9 oz














I&O: 


 











 10/11/18 10/12/18 10/13/18





 06:59 06:59 06:59


 


Intake Total 840 240 


 


Output Total 50  


 


Balance 790 240 











Result Diagrams: 


 10/11/18 04:12





 10/11/18 04:12


Additional Labs: 


 Accuchecks











  10/11/18 10/11/18 10/11/18





  21:05 17:03 11:40


 


POC Glucose  77  82  92








Labs reviewed by me





Phys Exam





- Physical Examination


Obesity


HEENT: moist MMs, sclera anicteric, oral pharynx no lesions, 2+ tonsils


Neck: no nodes, no JVD, supple, full ROM


Respiratory: no wheezing, no rales, no rhonchi, clear to auscultation bilateral


Cardiovascular: RRR, no rub


S1, S2


Gastrointestinal: soft, non-tender, no distention, positive bowel sounds


Neurological: moves all 4 limbs


Psychiatric: normal affect, A&O x 3





Dx/Plan


(1) Acute respiratory failure with hypoxia


Code(s): J96.01 - ACUTE RESPIRATORY FAILURE WITH HYPOXIA   Status: Acute   

Comment: Improving, but pt is still on supplemental oxygen.  Continue 

antibiotics as below and follow cultures for suspected sepsis.   





(2) ESRD (end stage renal disease) on dialysis


Code(s): N18.6 - END STAGE RENAL DISEASE; Z99.2 - DEPENDENCE ON RENAL DIALYSIS 

  Status: Chronic   Comment: nephrology service following   





(3) Gout


Code(s): M10.9 - GOUT, UNSPECIFIED   Status: Chronic   


Qualifiers: 


   Gout site: unspecified site   Gout etiology: unspecified cause   Chronicity: 

chronic   Presence of tophus: without tophus   Qualified Code(s): M1A.9XX0 - 

Chronic gout, unspecified, without tophus (tophi)   


Comment: stable   





(4) Hypertension


Code(s): I10 - ESSENTIAL (PRIMARY) HYPERTENSION   Status: Chronic   Comment: on 

PRN IV hydralazine for blood pressure spikes   





(5) Chronic hepatitis C


Code(s): B18.2 - CHRONIC VIRAL HEPATITIS C   Status: Chronic   Comment: stable 

  





- Plan


continue antibiotics, PT/OT





* .








Review of Systems





- Review of Systems


Constitutional: weakness.  negative: fever, chills, sweats, malaise


Respiratory: negative: Cough, Shortness of Breath, SOB with Excertion, 

Pleuritic Pain, Wheezing


Cardiovascular: negative: chest pain, palpitations, orthopnea, paroxysmal 

nocturnal dyspnea, edema, light headedness


Gastrointestinal: negative: Nausea, Vomiting, Abdominal Pain, Diarrhea, 

Constipation, Melena, Hematochezia


Genitourinary: negative: Dysuria, Frequency, Incontinence, Hematuria, Retention


Skin: negative: Rash, Lesions, Jae, Bruising





- Medications/Allergies


Allergies/Adverse Reactions: 


 Allergies











Allergy/AdvReac Type Severity Reaction Status Date / Time


 


No Known Drug Allergies Allergy   Verified 11/02/15 08:35











Medications: 


 Current Medications





Acetaminophen (Tylenol)  650 mg PO Q4H PRN


   PRN Reason: Headache/Fever/Mild Pain (1-3)


Hydrocodone Bitart/Acetaminophen (Norco 5/325)  1 tab PO Q4H PRN


   PRN Reason: Moderate Pain (4-6)


   Last Admin: 10/10/18 15:23 Dose:  1 tab


Allopurinol (Zyloprim)  300 mg PO DAILY [facility]


   Last Admin: 10/12/18 11:52 Dose:  Not Given


Aspirin (Ecotrin)  81 mg PO DAILY [facility]


   Last Admin: 10/12/18 11:53 Dose:  Not Given


Atorvastatin Calcium (Lipitor)  10 mg PO HS [facility]


   Last Admin: 10/11/18 21:01 Dose:  10 mg


Benzonatate (Tessalon)  100 mg PO Q4H PRN


   PRN Reason: Cough


Bisacodyl (Dulcolax)  10 mg PO DAILYPRN PRN


   PRN Reason: Constipation


Bisacodyl (Dulcolax)  10 mg SD DAILYPRN PRN


   PRN Reason: Constipation


Calcium Carbonate (Tums)  1,000 mg PO Q4H PRN


   PRN Reason: Heartburn  or Indigestion


Carvedilol (Coreg)  25 mg PO TID [facility]


   Last Admin: 10/12/18 14:56 Dose:  25 mg


Clonidine (Catapres)  0.1 mg PO Q4H PRN


   PRN Reason: Systolic BP > 160


   Last Admin: 10/12/18 12:32 Dose:  0.1 mg


Cosyntropin (Cortrosyn)  250 mcg SLOW IVP WILL[facility]


   Last Admin: 10/11/18 15:32 Dose:  250 mcg


Doxycycline Hyclate (Vibramycin)  100 mg PO BID [facility]


   Last Admin: 10/12/18 11:53 Dose:  Not Given


Famotidine (Pepcid)  20 mg PO DAILY [facility]


   Last Admin: 10/12/18 11:53 Dose:  Not Given


Ferrous Sulfate (Feosol)  325 mg PO [facility]


   Last Admin: 10/12/18 11:52 Dose:  Not Given


Fish Oil (Fish Oil)  1,000 mg PO BID [facility]


   Last Admin: 10/12/18 11:53 Dose:  Not Given


Guaifenesin (Robitussin Sf)  200 mg PO Q4H PRN


   PRN Reason: Cough


Heparin Sodium (Porcine) (Heparin)  5,000 units SC BID [facility]


   Last Admin: 10/12/18 11:53 Dose:  Not Given


Hydralazine HCl (Apresoline)  25 mg PO TID [facility]


   Last Admin: 10/12/18 14:56 Dose:  25 mg


Hydralazine HCl (Apresoline)  10 mg SLOW IVP Q6H PRN


   PRN Reason: SBP Greater Than 170


Vancomycin HCl 1.25 gm/ Sodium (Chloride)  250 mls @ 166.667 mls/hr IVPB 

[facility]


Vancomycin HCl 1 gm/ Device  200 mls @ 200 mls/hr IVPB [facility]


Vancomycin HCl 750 mg/ Sodium (Chloride)  250 mls @ 250 mls/hr IVPB WILLCALL [facility]


Insulin Glargine 8 units/ (Miscellaneous Medication)  0.08 mls @ 0 mls/hr SC 

QAM [facility]


   Last Admin: 10/12/18 11:53 Dose:  Not Given


Isosorbide Mononitrate (Imdur)  60 mg PO BID [facility]


   Last Admin: 10/12/18 11:54 Dose:  Not Given


Levofloxacin (Levaquin)  250 mg PO 0600 [facility]


   Last Admin: 10/12/18 11:52 Dose:  Not Given


Loratadine (Claritin)  10 mg PO DAILYPRN PRN


   PRN Reason: Sinus Symptoms


Miscellaneous Medication (Pharmacy To Dose)  1 each IVPB PRN PRN


   PRN Reason: Pharmacy to dose


Nifedipine (Procardia Xl)  90 mg PO DAILY [facility]


   Last Admin: 10/12/18 11:54 Dose:  Not Given


Nitroglycerin (Nitrostat)  0.4 mg SL Q5MIN PRN


   PRN Reason: Chest Pain


Hold Vancomycin For (Level >20)  0 each FS .AT DIALYSIS [facility]


Ondansetron HCl (Zofran)  4 mg IVP Q6H PRN


   PRN Reason: Nausea/Vomiting


Pantoprazole Sodium (Protonix)  40 mg PO DAILY [facility]


   Last Admin: 10/12/18 11:54 Dose:  Not Given


Polyethylene Glycol (Miralax)  17 gm PO DAILY [facility]


   Last Admin: 10/12/18 11:54 Dose:  Not Given


Senna (Senokot)  2 tab PO HSPRN PRN


   PRN Reason: Constipation


Senna/Docusate Sodium (Senokot S)  2 tab PO BID PRN


   PRN Reason: Constipation


Sodium Chloride (Flush - Normal Saline)  10 ml IVF PRN PRN


   PRN Reason: Saline Flush


Tramadol HCl (Ultram)  50 mg PO Q4H PRN


   PRN Reason: Moderate Pain (4-6)

## 2018-10-12 NOTE — PRG
DATE OF SERVICE:  10/12/2018

 

SERVICE:  Renal Medicine.

 

SUBJECTIVE:  Mr. Scott is currently undergoing hemodialysis.  I am at the bedside supervising his dial
ysis.  He was admitted for generalized malaise.  Cardiac echo has been ordered and still currently pe
nding.  He feels a little better today.  Attempting to remove fluid removal as tolerated.  No other c
omplaints, no chest pain or shortness of breath.

 

PHYSICAL EXAMINATION:

VITAL SIGNS:  Blood pressure is 168/62.  Attempting to remove 4.6 liters.

GENERAL:  Awake, alert, supine, comfortable.

SKIN:  Adequate turgor.

HEENT:  Pinkish conjunctivae, anicteric sclerae.

NECK:  No neck mass, no carotid bruits, no JVD.

CHEST:  No deformities.

LUNGS:  Decreased breath sounds.

HEART:  Normal sinus rhythm.  No murmur, no gallops, no rubs.

ABDOMEN:  Globular, soft, nontender, no masses.

EXTREMITIES:  No edema.

 

LABORATORY DATA:  Of 10/12/2018, pending.

 

MEDICATIONS:  Of 10/12/2018 was reviewed.

 

ASSESSMENT AND PLAN:

1.  Generalized malaise - this could be from a possible decreased ejection fraction causing generaliz
ed weakness.  He was also found to have some congestive heart failure on x-ray and has elevated BNP. 
 We are maxing out fluid removal.

2.  Congestive heart failure, maxing out fluid removal with dialysis.

3.  End-stage renal disease, stable.  Continue current Monday, Wednesday, Friday dialysis.  Again, ma
ximizing fluid removal.  Overall, agree with current management.

## 2018-10-13 LAB
ANION GAP SERPL CALC-SCNC: 10 MMOL/L (ref 10–20)
BUN SERPL-MCNC: 13 MG/DL (ref 8.4–25.7)
CALCIUM SERPL-MCNC: 8.8 MG/DL (ref 7.8–10.44)
CHLORIDE SERPL-SCNC: 99 MMOL/L (ref 98–107)
CO2 SERPL-SCNC: 31 MMOL/L (ref 22–29)
CREAT CL PREDICTED SERPL C-G-VRATE: 33 ML/MIN (ref 70–130)
GLUCOSE SERPL-MCNC: 89 MG/DL (ref 70–105)
POTASSIUM SERPL-SCNC: 3.2 MMOL/L (ref 3.5–5.1)
SODIUM SERPL-SCNC: 137 MMOL/L (ref 136–145)
VANCOMYCIN SERPL-MCNC: 5 UG/ML

## 2018-10-13 RX ADMIN — HEPARIN SODIUM SCH UNITS: 5000 INJECTION, SOLUTION INTRAVENOUS; SUBCUTANEOUS at 11:43

## 2018-10-13 RX ADMIN — INSULIN GLARGINE SCH MLS: 100 INJECTION, SOLUTION SUBCUTANEOUS at 09:33

## 2018-10-13 RX ADMIN — ASPIRIN SCH MG: 81 TABLET ORAL at 09:33

## 2018-10-13 RX ADMIN — NIFEDIPINE SCH MG: 90 TABLET, EXTENDED RELEASE ORAL at 09:32

## 2018-10-13 RX ADMIN — HEPARIN SODIUM SCH UNITS: 5000 INJECTION, SOLUTION INTRAVENOUS; SUBCUTANEOUS at 20:00

## 2018-10-13 NOTE — PDOC.PN
- Subjective


Encounter Start Date: 10/13/18


Encounter Start Time: 09:50





Pt seen for followup with acute hypoxic respiratory failure.  Denies chest 

pain.  c/o generalized weakness.





- Objective


MAR Reviewed: Yes


Vital Signs & Weight: 


 Vital Signs (12 hours)











  Temp Pulse Resp BP BP BP Pulse Ox


 


 10/13/18 16:00  97.7 F  77  18   144/65 H   97


 


 10/13/18 14:22   76   135/72   


 


 10/13/18 12:00  97.4 F L  69  18    181/83 H  95


 


 10/13/18 09:55        97


 


 10/13/18 09:46      179/83 H  


 


 10/13/18 09:34   72   179/83 H   


 


 10/13/18 09:32   72   179/83 H   


 


 10/13/18 08:00  98.0 F  72  18   173/49 H   97








 Weight











Admit Weight                   204 lb 9 oz


 


Weight                         216 lb 11.43 oz














I&O: 


 











 10/12/18 10/13/18 10/14/18





 06:59 06:59 06:59


 


Intake Total 240 450 


 


Balance 240 450 











Result Diagrams: 


 10/14/18 05:39





 10/14/18 05:39


Additional Labs: 


 Accuchecks











  10/13/18 10/13/18 10/13/18





  16:40 11:42 05:40


 


POC Glucose  116 H  109  88














  10/12/18





  20:40


 


POC Glucose  139 H








Labs reviewed by me





Phys Exam





- Physical Examination


Constitutional: NAD


HEENT: moist MMs


Neck: supple


Respiratory: clear to auscultation bilateral


Cardiovascular: RRR


Gastrointestinal: soft


Neurological: moves all 4 limbs


Psychiatric: normal affect





Dx/Plan


(1) Acute respiratory failure with hypoxia


Code(s): J96.01 - ACUTE RESPIRATORY FAILURE WITH HYPOXIA   Status: Acute   

Comment: Improving, but pt is still on supplemental oxygen.  Will try to wean 

off of oxygen.  Continuing antibiotics for now for suspected sepsis.   





(2) ESRD (end stage renal disease) on dialysis


Code(s): N18.6 - END STAGE RENAL DISEASE; Z99.2 - DEPENDENCE ON RENAL DIALYSIS 

  Status: Chronic   Comment: nephrology service following for dialysis   





(3) Gout


Code(s): M10.9 - GOUT, UNSPECIFIED   Status: Chronic   


Qualifiers: 


   Gout site: unspecified site   Gout etiology: unspecified cause   Chronicity: 

chronic   Presence of tophus: without tophus   Qualified Code(s): M1A.9XX0 - 

Chronic gout, unspecified, without tophus (tophi)   


Comment: stable   





(4) Hypertension


Code(s): I10 - ESSENTIAL (PRIMARY) HYPERTENSION   Status: Chronic   Comment: 

Increase scheduled hydralazine to 50 mg PO TID   





(5) Chronic hepatitis C


Code(s): B18.2 - CHRONIC VIRAL HEPATITIS C   Status: Chronic   Comment: stable 

  





- Plan





* .








Review of Systems





- Review of Systems


Constitutional: weakness.  negative: fever, chills, sweats, malaise


Cardiovascular: negative: chest pain, palpitations, orthopnea, paroxysmal 

nocturnal dyspnea, edema, light headedness





- Medications/Allergies


Allergies/Adverse Reactions: 


 Allergies











Allergy/AdvReac Type Severity Reaction Status Date / Time


 


No Known Drug Allergies Allergy   Verified 11/02/15 08:35











Medications: 


 Current Medications





Acetaminophen (Tylenol)  650 mg PO Q4H PRN


   PRN Reason: Headache/Fever/Mild Pain (1-3)


Hydrocodone Bitart/Acetaminophen (Norco 5/325)  1 tab PO Q4H PRN


   PRN Reason: Moderate Pain (4-6)


   Last Admin: 10/10/18 15:23 Dose:  1 tab


Allopurinol (Zyloprim)  300 mg PO DAILY [facility]


   Last Admin: 10/13/18 09:33 Dose:  300 mg


Aspirin (Ecotrin)  81 mg PO DAILY [facility]


   Last Admin: 10/13/18 09:33 Dose:  81 mg


Atorvastatin Calcium (Lipitor)  10 mg PO HS [facility]


   Last Admin: 10/12/18 20:07 Dose:  10 mg


Benzonatate (Tessalon)  100 mg PO Q4H PRN


   PRN Reason: Cough


Bisacodyl (Dulcolax)  10 mg PO DAILYPRN PRN


   PRN Reason: Constipation


Bisacodyl (Dulcolax)  10 mg MS DAILYPRN PRN


   PRN Reason: Constipation


Calcium Carbonate (Tums)  1,000 mg PO Q4H PRN


   PRN Reason: Heartburn  or Indigestion


Carvedilol (Coreg)  25 mg PO TID [facility]


   Last Admin: 10/13/18 14:22 Dose:  25 mg


Clonidine (Catapres)  0.1 mg PO Q4H PRN


   PRN Reason: Systolic BP > 160


   Last Admin: 10/12/18 16:31 Dose:  0.1 mg


Cosyntropin (Cortrosyn)  250 mcg SLOW IVP [facility]


   Last Admin: 10/11/18 15:32 Dose:  250 mcg


Doxycycline Hyclate (Vibramycin)  100 mg PO BID [facility]


   Last Admin: 10/13/18 11:43 Dose:  100 mg


Ferrous Sulfate (Feosol)  325 mg PO QAM-[facility]


   Last Admin: 10/13/18 09:32 Dose:  325 mg


Fish Oil (Fish Oil)  1,000 mg PO BID [facility]


   Last Admin: 10/13/18 09:32 Dose:  1,000 mg


Guaifenesin (Robitussin Sf)  200 mg PO Q4H PRN


   PRN Reason: Cough


Heparin Sodium (Porcine) (Heparin)  5,000 units SC BID [facility]


   Last Admin: 10/13/18 11:43 Dose:  5,000 units


Hydralazine HCl (Apresoline)  25 mg PO TID [facility]


   Last Admin: 10/13/18 14:22 Dose:  25 mg


Hydralazine HCl (Apresoline)  10 mg SLOW IVP Q6H PRN


   PRN Reason: SBP Greater Than 170


Vancomycin HCl 1.25 gm/ Sodium (Chloride)  250 mls @ 166.667 mls/hr IVPB 

[facility]


Vancomycin HCl 1 gm/ Device  200 mls @ 200 mls[facility]hr [facility]


Vancomycin HCl 750 mg/ Sodium (Chloride)  250 mls @ 250 mls/hr [facility]


Insulin Glargine 8 units/ (Miscellaneous Medication)  0.08 mls @ 0 mls/hr SC 

[facility] [place] [place]


   Last Admin: 10/13/18 09:33 Dose:  0.08 mls


Isosorbide Mononitrate (Imdur)  60 mg PO BID [facility]


   Last Admin: 10/13/18 09:33 Dose:  60 mg


Levofloxacin (Levaquin)  250 mg PO 0600 [facility]


   Last Admin: 10/13/18 05:34 Dose:  250 mg


Loratadine (Claritin)  10 mg PO DAILYPRN PRN


   PRN Reason: Sinus Symptoms


Miscellaneous Medication (Pharmacy To Dose)  1 each IVPB PRN PRN


   PRN Reason: Pharmacy to dose


Nifedipine (Procardia Xl)  90 mg PO DAILY [facility]


   Last Admin: 10/13/18 09:32 Dose:  90 mg


Nitroglycerin (Nitrostat)  0.4 mg SL Q5MIN PRN


   PRN Reason: Chest Pain


Hold Vancomycin For (Level >20)  0 each FS .AT DIALYSIS [facility]


Ondansetron HCl (Zofran)  4 mg IVP Q6H PRN


   PRN Reason: Nausea/Vomiting


Pantoprazole Sodium (Protonix)  40 mg PO DAILY [facility]


   Last Admin: 10/13/18 09:33 Dose:  40 mg


Polyethylene Glycol (Miralax)  17 gm PO DAILY [facility]


   Last Admin: 10/13/18 09:33 Dose:  Not Given


Senna (Senokot)  2 tab PO HSPRN PRN


   PRN Reason: Constipation


Senna/Docusate Sodium (Senokot S)  2 tab PO BID PRN


   PRN Reason: Constipation


Sodium Chloride (Flush - Normal Saline)  10 ml IVF PRN PRN


   PRN Reason: Saline Flush


Tramadol HCl (Ultram)  50 mg PO Q4H PRN


   PRN Reason: Moderate Pain (4-6)

## 2018-10-14 LAB
ANION GAP SERPL CALC-SCNC: 11 MMOL/L (ref 10–20)
BASOPHILS # BLD AUTO: 0 THOU/UL (ref 0–0.2)
BASOPHILS NFR BLD AUTO: 0.9 % (ref 0–1)
BUN SERPL-MCNC: 22 MG/DL (ref 8.4–25.7)
CALCIUM SERPL-MCNC: 8.9 MG/DL (ref 7.8–10.44)
CHLORIDE SERPL-SCNC: 99 MMOL/L (ref 98–107)
CO2 SERPL-SCNC: 29 MMOL/L (ref 22–29)
CREAT CL PREDICTED SERPL C-G-VRATE: 22 ML/MIN (ref 70–130)
EOSINOPHIL # BLD AUTO: 0.6 THOU/UL (ref 0–0.7)
EOSINOPHIL NFR BLD AUTO: 16.2 % (ref 0–10)
GLUCOSE SERPL-MCNC: 76 MG/DL (ref 70–105)
HGB BLD-MCNC: 9.8 G/DL (ref 14–18)
LYMPHOCYTES # BLD: 0.8 THOU/UL (ref 1.2–3.4)
LYMPHOCYTES NFR BLD AUTO: 20.5 % (ref 21–51)
MCH RBC QN AUTO: 28.3 PG (ref 27–31)
MCV RBC AUTO: 86.4 FL (ref 78–98)
MONOCYTES # BLD AUTO: 0.4 THOU/UL (ref 0.11–0.59)
MONOCYTES NFR BLD AUTO: 9.5 % (ref 0–10)
NEUTROPHILS # BLD AUTO: 2.1 THOU/UL (ref 1.4–6.5)
NEUTROPHILS NFR BLD AUTO: 52.9 % (ref 42–75)
PLATELET # BLD AUTO: 225 THOU/UL (ref 130–400)
POTASSIUM SERPL-SCNC: 3.2 MMOL/L (ref 3.5–5.1)
RBC # BLD AUTO: 3.45 MILL/UL (ref 4.7–6.1)
SODIUM SERPL-SCNC: 136 MMOL/L (ref 136–145)
WBC # BLD AUTO: 4 THOU/UL (ref 4.8–10.8)

## 2018-10-14 RX ADMIN — HEPARIN SODIUM SCH UNITS: 5000 INJECTION, SOLUTION INTRAVENOUS; SUBCUTANEOUS at 09:14

## 2018-10-14 RX ADMIN — HEPARIN SODIUM SCH UNITS: 5000 INJECTION, SOLUTION INTRAVENOUS; SUBCUTANEOUS at 20:27

## 2018-10-14 RX ADMIN — NIFEDIPINE SCH MG: 90 TABLET, EXTENDED RELEASE ORAL at 12:00

## 2018-10-14 RX ADMIN — INSULIN GLARGINE SCH: 100 INJECTION, SOLUTION SUBCUTANEOUS at 09:15

## 2018-10-14 RX ADMIN — ASPIRIN SCH MG: 81 TABLET ORAL at 09:14

## 2018-10-14 NOTE — PDOC.PN
- Subjective


Encounter Start Date: 10/14/18


Encounter Start Time: 10:00





Pt seen for followup re: acute hypoxic respiratory failure.  Feels weak.  No 

other complaints.





- Objective


Vital Signs & Weight: 


 Vital Signs (12 hours)











  Temp Pulse Resp BP BP Pulse Ox


 


 10/14/18 15:15  97.4 F L  71  18   145/62 H  95


 


 10/14/18 14:57   71   135/60  


 


 10/14/18 14:55   71    135/60  94 L


 


 10/14/18 12:00  98.0 F  71  16  166/65 H  166/65 H  95


 


 10/14/18 09:22       94 L


 


 10/14/18 09:14   72   164/70 H  


 


 10/14/18 08:00  98.1 F  72  18   164/70 H  94 L








 Weight











Admit Weight                   204 lb 9 oz


 


Weight                         216 lb 11.43 oz














I&O: 


 











 10/13/18 10/14/18 10/15/18





 06:59 06:59 06:59


 


Intake Total 450 360 


 


Balance 450 360 











Result Diagrams: 


 10/14/18 05:39





 10/14/18 05:39


Additional Labs: 


 Accuchecks











  10/14/18 10/14/18 10/14/18





  16:36 11:44 04:28


 


POC Glucose  112 H  80  65 L














  10/13/18





  20:31


 


POC Glucose  88














Phys Exam





- Physical Examination


Constitutional: NAD


HEENT: moist MMs


Neck: supple


Respiratory: clear to auscultation bilateral


Cardiovascular: RRR


Gastrointestinal: non-tender


Neurological: moves all 4 limbs


Lymphatic: no nodes


Psychiatric: normal affect





Dx/Plan


(1) Acute respiratory failure with hypoxia


Code(s): J96.01 - ACUTE RESPIRATORY FAILURE WITH HYPOXIA   Status: Acute   

Comment: Improving, but pt is still on supplemental oxygen.  Will try to wean 

off of oxygen.  Discontinue antibiotics, all cultures negative so far   





(2) ESRD (end stage renal disease) on dialysis


Code(s): N18.6 - END STAGE RENAL DISEASE; Z99.2 - DEPENDENCE ON RENAL DIALYSIS 

  Status: Chronic   Comment: dialysis per nephrology   





(3) Gout


Code(s): M10.9 - GOUT, UNSPECIFIED   Status: Chronic   


Qualifiers: 


   Gout site: unspecified site   Gout etiology: unspecified cause   Chronicity: 

chronic   Presence of tophus: without tophus   Qualified Code(s): M1A.9XX0 - 

Chronic gout, unspecified, without tophus (tophi)   


Comment: stable   





(4) Hypertension


Code(s): I10 - ESSENTIAL (PRIMARY) HYPERTENSION   Status: Chronic   Comment: 

Increase scheduled hydralazine to 50 mg PO TID yesterday, continue to monitor 

vital signs and titrate antihypertensives as needed   





(5) Chronic hepatitis C


Code(s): B18.2 - CHRONIC VIRAL HEPATITIS C   Status: Chronic   Comment: stable 

  





- Plan


PT/OT, out of bed/ambulate





* .


Ambulate patient.


Discontinue antibiotics and observe.


Wean off of supplemental oxygen.





Review of Systems





- Review of Systems


Constitutional: weakness.  negative: fever, chills, sweats, malaise


Respiratory: negative: Cough, Shortness of Breath, SOB with Excertion, 

Pleuritic Pain, Wheezing


Cardiovascular: negative: chest pain, palpitations, orthopnea, paroxysmal 

nocturnal dyspnea, edema, light headedness





- Medications/Allergies


Allergies/Adverse Reactions: 


 Allergies











Allergy/AdvReac Type Severity Reaction Status Date / Time


 


No Known Drug Allergies Allergy   Verified 11/02/15 08:35











Medications: 


 Current Medications





Acetaminophen (Tylenol)  650 mg PO Q4H PRN


   PRN Reason: Headache/Fever/Mild Pain (1-3)


Hydrocodone Bitart/Acetaminophen (Norco 5/325)  1 tab PO Q4H PRN


   PRN Reason: Moderate Pain (4-6)


   Last Admin: 10/10/18 15:23 Dose:  1 tab


Allopurinol (Zyloprim)  150 mg PO [facility]ILY [facility]


Aspirin (Ecotrin)  81 mg PO DAILY [facility]


   Last Admin: 10/14/18 09:14 Dose:  81 mg


Atorvastatin Calcium (Lipitor)  10 mg PO [facility]


   Last Admin: 10/13/18 19:59 Dose:  10 mg


Benzonatate (Tessalon)  100 mg PO Q4H PRN


   PRN Reason: Cough


Bisacodyl (Dulcolax)  10 mg PO DAILYPRN PRN


   PRN Reason: Constipation


Bisacodyl (Dulcolax)  10 mg CA DAILYPRN PRN


   PRN Reason: Constipation


Calcium Carbonate (Tums)  1,000 mg PO Q4H PRN


   PRN Reason: Heartburn  or Indigestion


Carvedilol (Coreg)  25 mg PO TID [facility]


   Last Admin: 10/14/18 14:57 Dose:  25 mg


Clonidine (Catapres)  0.1 mg PO Q4H PRN


   PRN Reason: Systolic BP > 160


   Last Admin: 10/14/18 04:40 Dose:  0.1 mg


Cosyntropin (Cortrosyn)  250 mcg SLOW IVP [facility]


   Last Admin: 10/11/18 15:32 Dose:  250 mcg


Doxycycline Hyclate (Vibramycin)  100 mg PO BID [facility]


   Last Admin: 10/14/18 12:00 Dose:  100 mg


Epoetin Fausto (Procrit)  7,500 units SC Q7D [facility]


   Last Admin: 10/14/18 14:57 Dose:  7,500 units


Ferrous Sulfate (Feosol)  325 mg PO [facility]AM[facility]


   Last Admin: 10/14/18 09:13 Dose:  325 mg


Fish Oil (Fish Oil)  1,000 mg PO BID [facility]


   Last Admin: 10/14/18 09:14 Dose:  1,000 mg


Guaifenesin (Robitussin Sf)  200 mg PO Q4H PRN


   PRN Reason: Cough


Heparin Sodium (Porcine) (Heparin)  5,000 units SC BID [facility]


   Last Admin: 10/14/18 09:14 Dose:  5,000 units


Hydralazine HCl (Apresoline)  10 mg SLOW IVP Q6H PRN


   PRN Reason: SBP Greater Than 170


Hydralazine HCl (Apresoline)  50 mg PO TID [facility]


   Last Admin: 10/14/18 14:57 Dose:  50 mg


Vancomycin HCl 1.25 gm/ Sodium (Chloride)  250 mls @ 166.667 mls/hr IVPB 

[facility]


Vancomycin HCl 1 gm/ Device  200 mls @ 200 mls/hr [facility]PB [facility]


Vancomycin HCl 750 mg/ Sodium (Chloride)  250 mls @ 250 mls/hr IVPB [facility]


Insulin Glargine 8 units/ (Miscellaneous Medication)  0.08 mls @ 0 mls/hr SC 

[facility] [place]


   Last Admin: 10/14/18 09:15 Dose:  Not Given


Isosorbide Mononitrate (Imdur)  60 mg PO BID [facility]


   Last Admin: 10/14/18 09:14 Dose:  60 mg


Levofloxacin (Levaquin)  250 mg PO 0600 [facility]


   Last Admin: 10/14/18 04:41 Dose:  250 mg


Loratadine (Claritin)  10 mg PO DAILYPRN PRN


   PRN Reason: Sinus Symptoms


Miscellaneous Medication (Pharmacy To Dose)  1 each IVPB PRN PRN


   PRN Reason: Pharmacy to dose


Nifedipine (Procardia Xl)  90 mg PO DAILY [facility]


   Last Admin: 10/14/18 12:00 Dose:  90 mg


Nitroglycerin (Nitrostat)  0.4 mg SL Q5MIN PRN


   PRN Reason: Chest Pain


Hold Vancomycin For (Level >20)  0 each FS .AT [facility]IALYSIS [facility]


Ondansetron HCl (Zofran)  4 mg IVP Q6H PRN


   PRN Reason: Nausea/Vomiting


Pantoprazole Sodium (Protonix)  40 mg PO DAILY [facility]


   Last Admin: 10/14/18 09:13 Dose:  40 mg


Polyethylene Glycol (Miralax)  17 gm PO DAILY [facility]


   Last Admin: 10/14/18 09:15 Dose:  Not Given


Senna (Senokot)  2 tab PO HSPRN PRN


   PRN Reason: Constipation


Senna/Docusate Sodium (Senokot S)  2 tab PO BID PRN


   PRN Reason: Constipation


Sodium Chloride (Flush - Normal Saline)  10 ml IVF PRN PRN


   PRN Reason: Saline Flush


Tramadol HCl (Ultram)  50 mg PO Q4H PRN


   PRN Reason: Moderate Pain (4-6)

## 2018-10-14 NOTE — PRG
DATE OF SERVICE:  10/14/2018

 

SERVICE:  Renal Medicine.

 

SUBJECTIVE:  Mr. Scott is a 58-year-old black male with ESRD, who was admitted for generalized malaise
.  He was empirically treated for a possible infection.  In addition, a cardiac echo was done, which 
showed a normal EF.  This morning, he is feeling a little better.  He denies any chest pain or shortn
ess of breath.

 

PHYSICAL EXAMINATION:

VITAL SIGNS:  Blood pressure is noted at 164/70, heart rate 72, respiratory rate 18, temperature 98.1
, pulse ox 94%.

GENERAL EXAM:  Noted to be awake, alert, supine, comfortable, not in distress.

SKIN:  Adequate turgor.

HEENT:  He has slightly pale conjunctivae, anicteric sclerae.

NECK:  No neck mass, no carotid bruits, no JVD.

CHEST:  No deformities.

LUNGS:  Decreased breath sounds.

HEART:  Normal sinus rhythm.  No murmur, no gallops, no rubs.

ABDOMEN:  Globular, soft, nontender, no masses.

EXTREMITIES:  No edema.

 

Medications of 10/14/2018 reviewed.

 

LABORATORY DATA:  Laboratories of 10/14/2018, white count 4, hemoglobin 9.8.  Sodium 136, potassium 3
.2, chloride 99, carbon dioxide 29, BUN 22, creatinine 4.93, glucose 76, calcium 8.9.

 

ASSESSMENT AND PLAN:

1.  Mild hypokalemia.  We will observe

2.  End-stage renal disease, stable.  Continuing Monday, Wednesday, Friday dialysis.  Fluid removal o
nly as tolerated.  No indication for any emergent dialysis today.

3.  Generalized malaise slowly improving.  Unclear etiology.  Cortisol levels were noted to be normal
.  In addition, cardiac echocardiogram showed normal ejection fraction.

4.  Anemia.  Start maintenance Epogen at 7500 units subcutaneous every week.

 

Agree with current management.

## 2018-10-15 LAB
ANION GAP SERPL CALC-SCNC: 16 MMOL/L (ref 10–20)
BASOPHILS # BLD AUTO: 0 THOU/UL (ref 0–0.2)
BASOPHILS NFR BLD AUTO: 1 % (ref 0–1)
BUN SERPL-MCNC: 30 MG/DL (ref 8.4–25.7)
CALCIUM SERPL-MCNC: 8.9 MG/DL (ref 7.8–10.44)
CHLORIDE SERPL-SCNC: 98 MMOL/L (ref 98–107)
CO2 SERPL-SCNC: 24 MMOL/L (ref 22–29)
CREAT CL PREDICTED SERPL C-G-VRATE: 17 ML/MIN (ref 70–130)
EOSINOPHIL # BLD AUTO: 0.7 THOU/UL (ref 0–0.7)
EOSINOPHIL NFR BLD AUTO: 15.7 % (ref 0–10)
GLUCOSE SERPL-MCNC: 72 MG/DL (ref 70–105)
HGB BLD-MCNC: 9.9 G/DL (ref 14–18)
LYMPHOCYTES # BLD: 0.9 THOU/UL (ref 1.2–3.4)
LYMPHOCYTES NFR BLD AUTO: 22.3 % (ref 21–51)
MCH RBC QN AUTO: 28.3 PG (ref 27–31)
MCV RBC AUTO: 85.1 FL (ref 78–98)
MONOCYTES # BLD AUTO: 0.5 THOU/UL (ref 0.11–0.59)
MONOCYTES NFR BLD AUTO: 11.9 % (ref 0–10)
NEUTROPHILS # BLD AUTO: 2.1 THOU/UL (ref 1.4–6.5)
NEUTROPHILS NFR BLD AUTO: 49.1 % (ref 42–75)
PLATELET # BLD AUTO: 262 THOU/UL (ref 130–400)
POTASSIUM SERPL-SCNC: 3.6 MMOL/L (ref 3.5–5.1)
RBC # BLD AUTO: 3.49 MILL/UL (ref 4.7–6.1)
SODIUM SERPL-SCNC: 134 MMOL/L (ref 136–145)
VANCOMYCIN SERPL-MCNC: 20.7 UG/ML
WBC # BLD AUTO: 4.2 THOU/UL (ref 4.8–10.8)

## 2018-10-15 RX ADMIN — HEPARIN SODIUM SCH UNITS: 5000 INJECTION, SOLUTION INTRAVENOUS; SUBCUTANEOUS at 08:19

## 2018-10-15 RX ADMIN — HEPARIN SODIUM SCH UNITS: 5000 INJECTION, SOLUTION INTRAVENOUS; SUBCUTANEOUS at 20:25

## 2018-10-15 RX ADMIN — NIFEDIPINE SCH MG: 90 TABLET, EXTENDED RELEASE ORAL at 14:56

## 2018-10-15 RX ADMIN — ASPIRIN SCH MG: 81 TABLET ORAL at 08:15

## 2018-10-15 RX ADMIN — INSULIN GLARGINE SCH: 100 INJECTION, SOLUTION SUBCUTANEOUS at 09:00

## 2018-10-15 NOTE — PQF
CLINICAL DOCUMENTATION IMPROVEMENT CLARIFICATION FORM:  ICD-10 Updated



PLEASE DO AN ADDENDUM TO THE PROGRESS NOTE WITH ANY DOCUMENTATION UPDATES OR 
ADDITIONS AND CARRY THROUGH TO DC SUMMARY.   THANK YOU.



DATE:        10/16/18                                                       ATTN
:  DR. GARDNER





Please exercise your independent, professional judgment in responding to the 
clarification form. 

Clinical indicators are provided on the bottom of this form for your review





Please check appropriate box(s) to clarify if the following diagnosis has been 
ruled in or  ruled out:  SEPSIS





[  ] Ruled in diagnosis

     [  ] Continue to treat        [  ] Resolved

[  ] Ruled out diagnosis

[  ] Cannot rule out diagnosis

[  ] Other diagnosis ___________

[x  ] Unable to determine



In addition, please specify:

Present on Admission (POA):  [  ] Yes             [  ] No             [  x] 
Unable to determine



For continuity of documentation, please document condition throughout progress 
notes and discharge summary.  Thank You.



CLINICAL INDICATORS - SIGNS / SYMPTOMS / LABS





ER NOTE: "HISTORIAN REPORTS CHILLS, FATIGUE, REPORTS FEVER"



ER DDX: "SEPSIS"



PROGRESS NOTE: "CONTINUING ANTIBIOTICS NOW FOR SUSPECTED SEPSIS"



10/11:  WBC 3.8



RR 22



RISKS:

POSSIBLE PNEUMONIA PER H&P



TREATMENT:

IV CEFEPIME (ER)

IV VANCOMYCIN (ER)

VIBRAMYCIN (10/10-PRESENT)

URINE AND BLOOD CULTURES







(This form is maintained as a part of the permanent medical record)

 2015 [employer].  All Rights Reserved

[email or web address]    Office:  [phone]

                                                              

MTDPOOL

## 2018-10-15 NOTE — PQF
CLINICAL DOCUMENTATION IMPROVEMENT CLARIFICATION FORM:  ICD-10 Updated



PLEASE DO AN ADDENDUM TO THE PROGRESS NOTE WITH ANY DOCUMENTATION UPDATES OR 
ADDITIONS AND CARRY THROUGH TO DC SUMMARY.   THANK YOU.



DATE:         10/15/18                                                     ATTN
:  DR. OJEDA





Please exercise your independent, professional judgment in responding to the 
clarification form. 

Clinical indicators are provided on the bottom of this form for your review





Please check appropriate box(s) to clarify if the following diagnosis has been 
ruled in or  ruled out:  PNEUMONIA





[ x ] Ruled in diagnosis

     [ x ] Continue to treat        [  ] Resolved

[  ] Ruled out diagnosis

[  ] Cannot rule out diagnosis

[  ] Other diagnosis ___________

[  ] Unable to determine



In addition, please specify:

Present on Admission (POA):  [x  ] Yes             [  ] No             [  ] 
Unable to determine



For continuity of documentation, please document condition throughout progress 
notes and discharge summary.  Thank You.



CLINICAL INDICATORS - SIGNS / SYMPTOMS / LABS





H&P: "A CHEST XRAY DONE IN THE ER WAS SUGGESTIVE OF POSSIBLE BIBASILAR 
ATELECTASIS VERSUS PNEUMONIA AND SOME FLUID OVERLOAD."



CHEST XRAY 10/10: "INCREASED ATTENUATION IN THE LEFT LOWER LOBE, WHICH COULD BE 
MILD ATELECTASIS OR EARLY PNEUMONIA."



RISKS:

ACUTE HYPOXIC RESPIRATORY FAILURE / SATS LOW 80'S @ HEMODIALYSIS

DIMINISHED BREATH SOUNDS (ER NURSING ASSESSMENT 10/14)



TREATMENT:

IV CEFEPIME (ER)

IV VANCOMYCIN (ER)

VIBRAMYCIN (10/10-PRESENT)

SUPPLEMENTAL OXYGEN



[employer] Win[employer] Viewer(This form is maintained as 
a part of the permanent medical record)

 2015 [employer].  All Rights Reserved

[email or web address]    Office:  [phone]

                                                              

 





[facility]

## 2018-10-16 VITALS — DIASTOLIC BLOOD PRESSURE: 68 MMHG | SYSTOLIC BLOOD PRESSURE: 145 MMHG

## 2018-10-16 VITALS — TEMPERATURE: 98.3 F

## 2018-10-16 LAB
ANION GAP SERPL CALC-SCNC: 12 MMOL/L (ref 10–20)
BUN SERPL-MCNC: 14 MG/DL (ref 8.4–25.7)
CALCIUM SERPL-MCNC: 9.2 MG/DL (ref 7.8–10.44)
CHLORIDE SERPL-SCNC: 100 MMOL/L (ref 98–107)
CO2 SERPL-SCNC: 27 MMOL/L (ref 22–29)
CREAT CL PREDICTED SERPL C-G-VRATE: 27 ML/MIN (ref 70–130)
GLUCOSE SERPL-MCNC: 97 MG/DL (ref 70–105)
POTASSIUM SERPL-SCNC: 3.5 MMOL/L (ref 3.5–5.1)
SODIUM SERPL-SCNC: 135 MMOL/L (ref 136–145)

## 2018-10-16 RX ADMIN — INSULIN GLARGINE SCH MLS: 100 INJECTION, SOLUTION SUBCUTANEOUS at 08:03

## 2018-10-16 RX ADMIN — NIFEDIPINE SCH MG: 90 TABLET, EXTENDED RELEASE ORAL at 07:59

## 2018-10-16 RX ADMIN — HEPARIN SODIUM SCH: 5000 INJECTION, SOLUTION INTRAVENOUS; SUBCUTANEOUS at 14:30

## 2018-10-16 RX ADMIN — ASPIRIN SCH MG: 81 TABLET ORAL at 08:01

## 2018-10-16 NOTE — DIS
DATE OF ADMISSION:  10/10/2018

 

DATE OF DISCHARGE:  10/16/2018

 

DISCHARGE DIAGNOSES:  As of the followin.  Acute respiratory failure with hypoxia.

2.  End-stage renal disease.

3.  Gout.

4.  Hypertension.

5.  Chronic hepatitis C.

 

HOSPITAL COURSE:  Patient is a very pleasant old male who presents to the hospital initially with com
plaints of shortness of breath and generalized weakness.  The patient's initial thoughts were possibl
e volume overload versus pneumonia.  The patient states that he was very compliant with his dialysis.
  Cardiac enzymes were normal and also antibiotics were started at this time.  The patient also had a
n echocardiogram which indicated an EF of 50% -55%, which is mild mitral regurgitation and tricuspid 
regurgitation.  The patient continued to improve throughout the hospital stay.  He did not require an
y oxygen on discharge, patient did complain today of some left knee pain.  I did x-rays which did not
 indicate any acute abnormality, just arthritis.  I did order physical therapy.  The patient refused.
  However, the patient has been getting up and walking to going to the bathroom.  Also, Nephrology ha
s seen this patient for dialysis purposes.

 

PHYSICAL EXAMINATION:

VITAL SIGNS:  Temperature of 98.8, heart rate of 77, blood pressure 145/68.

GENERAL:  He is awake, alert, oriented x3, does not appear in distress.

CARDIOVASCULAR:  S1, S2 present.  No murmurs or gallops.

ABDOMEN:  Soft, nontender.  Bowel sounds are present x2.

EXTREMITIES:  No edema.  Left knee, there is no redness or joint fluid effusion noted.  Just when I t
ouch the knee, patient states it hurts.

 

HOME MEDICATIONS ON DISCHARGE:  Aspirin 81 mg daily, allopurinol 300 mg daily, nifedipine 90 mg daily
, isosorbide 60 mg b.i.d., insulin 80 units q.a.m., iron 325 q.a.m., Nexium 40 mg daily, Coreg 25 t.i
.d., atorvastatin 10 mg at bedtime, hydralazine 25 mg t.i.d., doxycycline 100 mg b.i.d. for another 4
 days.

 

DISCHARGE INSTRUCTIONS:  Again, patient will be discharged home.  He will follow up with primary care
 doctor and also with his nephrologist.

## 2018-10-16 NOTE — PRG
DATE OF SERVICE:  10/16/2018

 

SERVICE:  Renal Medicine.

 

SUBJECTIVE:  Mr. Scott is a 58-year-old black male with end-stage renal disease and being followed by 
the Renal Service for his maintenance hemodialysis.  He underwent dialysis yesterday without any diff
iculty.  He was initially admitted for generalized malaise.  A cardiac echo has been done, which show
ed a normal EF.  In addition, the patient has been treated for an empiric infection.  He has been doi
ng better since admission.  No new complaints today.  He denies any chest pain or shortness of breath
.

 

PHYSICAL EXAMINATION:

VITAL SIGNS:  Blood pressure 172/70, heart rate 77, respiratory rate 18, temperature 98.3, pulse ox 9
9%.

GENERAL:  Noted to be awake, supine, comfortable, not in distress.

SKIN:  Adequate turgor.

HEENT:  He has slightly pale conjunctivae, anicteric sclerae.

NECK:  No neck mass, no carotid bruits, no JVD.

CHEST:  No deformities.

LUNGS:  Clear breath sounds.  No wheezing, no crackles.

HEART:  Normal sinus rhythm.  No murmur, no gallops, no rubs.

ABDOMEN:  Globular, soft, nontender, no masses.

EXTREMITIES:  No edema, no deformities.

 

MEDICATIONS:  Of 10/16/2018 was reviewed.

 

LABORATORY DATA:  Of 10/15/2018, white count 4.2, hemoglobin 9.9.  On 10/16/2018, sodium 135, potassi
um 3.5, chloride 100, carbon dioxide 27, BUN 14, creatinine 4.06, glucose 97, calcium 9.2.

 

ASSESSMENT AND PLAN:

1.  End-stage renal disease, stable.  Tolerating current hemodialysis regimen.  The patient had the f
luid removed yesterday and he tolerated said treatment.  Continue Monday, Wednesday, Friday dialysis.
  No changes will be made with the current dialysis regimen.

2.  Anemia, on weekly Epogen.

3.  Generalized malaise - unclear etiology, much improved.  From a renal point of view, the patient c
an be discharged anytime.

## 2018-10-16 NOTE — RAD
LEFT KNEE RADIOGRAPH FOUR VIEW SERIES:

 

 

INDICATION: 

Pain.

 

FINDINGS: 

There is mild osteophytosis.  Joint compartments are relatively well preserved, although there is mil
d narrowing of the patellofemoral joint space.  There is no significant suprapatellar joint capsular 
distention.  Scattered vascular disease is incidentally noted.  There is osseous demineralization.  N
o acute fracture.

 

IMPRESSION: 

1.  Findings most consistent with mild osteoarthritis.

 

2.  There is no acute fracture.

 

POS: [facility]

## 2018-10-16 NOTE — PDOC.PN
- Subjective


Encounter Start Date: 10/15/18


Encounter Start Time: 14:55


-: old records requested/rev


Pt seen and examined, chart reviewed in its entirety, this is my first visit 

with this patient





No F/C, no N/V/D/C, no CP or SOB, no cough or sputum production





All systems reviewed and neg except as above





Pt doing well with mobility and D/C'd by PT.  Still requiring O2, 2L NC at 

present








- Objective


MAR Reviewed: Yes


Vital Signs & Weight: 


 Vital Signs (12 hours)











  Temp Pulse Resp BP Pulse Ox


 


 10/16/18 07:13  98.3 F  77  18  172/70 H  99


 


 10/16/18 04:47  98.1 F  78  18  169/63 H  97


 


 10/16/18 00:00  98.1 F  77  18  154/53 H  96


 


 10/15/18 20:26   81   








 Weight











Admit Weight                   204 lb 9 oz


 


Weight                         216 lb 11.43 oz














I&O: 


 











 10/15/18 10/16/18 10/17/18





 06:59 06:59 06:59


 


Intake Total 930 860 


 


Output Total  4200 


 


Balance 930 -3340 











Result Diagrams: 


 10/15/18 04:41





 10/16/18 06:29


Additional Labs: 


 Accuchecks











  10/16/18 10/15/18 10/15/18





  04:52 20:27 16:33


 


POC Glucose  101  105  93














  10/15/18





  12:30


 


POC Glucose  93











Radiology Reviewed by me: Yes





Phys Exam





- Physical Examination


Constitutional: NAD


HEENT: PERRLA, moist MMs, sclera anicteric, oral pharynx no lesions


Neck: no nodes, no JVD, supple, full ROM


Respiratory: no rales, no rhonchi


coarse bilateral breath sounds, good air movement


Cardiovascular: RRR, no significant murmur, no rub


Gastrointestinal: soft, non-tender, no distention, positive bowel sounds


Musculoskeletal: edema present


Neurological: non-focal, normal sensation, moves all 4 limbs


Lymphatic: no nodes


Psychiatric: normal affect, A&O x 3


Skin: no rash, normal turgor, cap refill <2 seconds





Dx/Plan


(1) Acute respiratory failure with hypoxia


Code(s): J96.01 - ACUTE RESPIRATORY FAILURE WITH HYPOXIA   Status: Acute   

Comment: Improving, but pt is still on supplemental oxygen.  Will try to wean 

off of oxygen.  Discontinued antibiotics, all cultures negative   





(2) Hemodialysis access site with arteriovenous graft


Code(s): Z99.2 - DEPENDENCE ON RENAL DIALYSIS   Status: Chronic   





(3) Pneumonia, community acquired


Code(s): J18.9 - PNEUMONIA, UNSPECIFIED ORGANISM   Status: Acute   


Qualifiers: 


   Laterality: unspecified laterality   Qualified Code(s): J18.9 - Pneumonia, 

unspecified organism   





(4) Chronic hepatitis C


Code(s): B18.2 - CHRONIC VIRAL HEPATITIS C   Status: Chronic   


Qualifiers: 


   Hepatic coma status: without hepatic coma   Qualified Code(s): B18.2 - 

Chronic viral hepatitis C   


Comment: stable   





(5) DM type 2 (diabetes mellitus, type 2)


Status: Chronic   


Qualifiers: 


   Diabetes mellitus long term insulin use: with long term use   Diabetes 

mellitus complication status: with kidney complications   Chronic kidney 

disease stage: on chronic dialysis 





(6) ESRD (end stage renal disease) on dialysis


Code(s): N18.6 - END STAGE RENAL DISEASE; Z99.2 - DEPENDENCE ON RENAL DIALYSIS 

  Status: Chronic   Comment: dialysis per nephrology   





(7) Gout


Code(s): M10.9 - GOUT, UNSPECIFIED   Status: Chronic   


Qualifiers: 


   Gout site: unspecified site   Gout etiology: unspecified cause   Chronicity: 

chronic   Presence of tophus: without tophus   Qualified Code(s): M1A.9XX0 - 

Chronic gout, unspecified, without tophus (tophi)   


Comment: stable   





(8) Hypertension


Code(s): I10 - ESSENTIAL (PRIMARY) HYPERTENSION   Status: Chronic   


Qualifiers: 


   Hypertension type: essential hypertension   Qualified Code(s): I10 - 

Essential (primary) hypertension   


Comment: Increase scheduled hydralazine to 50 mg PO TID yesterday, continue to 

monitor vital signs and titrate antihypertensives as needed   





- Plan


cont current plan of care, PT/OT, respiratory therapy, out of bed/ambulate





* .


home in 1-2 days when O2 weaned off

## 2018-12-05 NOTE — RAD
SINGLE VIEW OF THE CHEST:

 

Comparison: 10-10-18

 

History: Generalized weakness. 

 

FINDINGS: 

Single view of the chest shows an enlarged but stable cardiomediastinal silhouette. There is no evide
nce of consolidation, mass, or pleural effusion. Degenerative changes are seen in the spine. 

 

IMPRESSION: 

1. No evidence of acute cardiopulmonary disease. 

2. Stable cardiomegaly.

 

POS: [facility]

## 2018-12-05 NOTE — HP
PRIMARY CARE PHYSICIAN:  [facility].



REASON FOR ADMISSION:  Hypotension and generalized weakness.



HISTORY OF PRESENT ILLNESS:  This is a 58-year-old  male, who 
has

underlying history of ESRD, on hemodialysis Monday, Wednesday, Friday as well as

underlying history of hypertension and dyslipidemia, who was brought to 
emergency

room for evaluation of generalized weakness.  This morning, the patient was too 
weak

to go to dialysis.  The patient was also feeling weak and dizzy and that is why 
the

patient called paramedics, and the patient was brought to emergency room for

evaluation.  In the emergency room, he was hypotensive, his blood pressure was

84/48.  He was given fluid and his blood pressure improved to normal.  He did

not have any fever or hypothermia.  He did not have any chest pain, palpitation
, or

dizziness.  He denies any syncope or fall.  He denies any increasing shortness 
of

breath.  He denies any orthopnea, PND, or leg swelling.  He denies any pleurisy 
or

pleuritic chest pain or lower extremity edema or calf tenderness.  He denies any

immobilization.  He denies any hemoptysis, cough, flu-like illness, or UTI 
symptoms.

 He denies any constipation, diarrhea, melena, or hematochezia. 



REVIEW OF SYSTEMS:  CONSTITUTIONAL:  Negative for weight loss or gain, ability 
to

conduct usual activities. 

SKIN:  Negative for rash, itching. 

EYES:  Negative for double vision, pain. 

ENT/MOUTH:  Negative for nose bleeding, neck stiffness, pain, tenderness. 

CARDIOVASCULAR:  Negative for palpitations, dyspnea on exertion, orthopnea. 

RESPIRATORY:  Negative for shortness of breath, wheezing, cough, hemoptysis, 
fever

or night sweats. 

GASTROINTESTINAL:  Negative for poor appetite, abdominal pain, heartburn, nausea
,

vomiting, constipation, or diarrhea. 

GENITOURINARY:  Negative for urgency, frequency, dysuria, nocturia. 

MUSCULOSKELETAL:  Negative for pain, swelling. 

NEUROLOGIC/PSYCHIATRIC:  Negative for anxiety, depression. 

ALLERGY/IMMUNOLOGIC:  Negative for skin rash, bleeding tendency. 



Please see my HPI for pertinent positives and negatives.  All other review of

systems reviewed and negative except as mentioned in the HPI. 



EMERGENCY ROOM COURSE:  The patient is given 500 mL IV fluid.



PAST MEDICAL HISTORY:  End-stage renal disease, on hemodialysis Monday, 
Wednesday,

Friday; diabetes, type 2; dyslipidemia; hypertension; chronic diastolic stage C

heart failure; chronic hepatitis C; and gout. 



PAST SURGICAL HISTORY:  Cardiac catheterization with angioplasty, cataract 
surgery,

and hemodialysis access. 



PAST PSYCHIATRIC HISTORY:  Reviewed and negative.



SOCIAL HISTORY:  The patient lives alone by himself.  He is independent for all

daily activities of routine life.  He denies any tobacco, alcohol, or illicit 
drug

abuse.  He quit smoking 30 years ago. 



FAMILY HISTORY:  Hypertension, diabetes, and heart disease runs among several 
family

members. 



ALLERGIES:  NO KNOWN DRUG ALLERGY.



CURRENT HOME MEDICATIONS:  

1. Coreg 25 mg p.o. b.i.d.

2. Lipitor 10 mg p.o. daily.

3. Hydralazine 100 mg three times daily.

4. Clonidine 0.1 mg twice daily.

5. Gabapentin 300 mg twice daily.

6. Imdur 60 mg daily.

7. Minoxidil 5 mg p.o. daily.

8. Procardia XL 30 mg p.o. daily.



PHYSICAL EXAMINATION:

VITAL SIGNS:  On arrival, blood pressure 84/48, pulse 67, respiratory rate 18,

temperature 98.1, saturation 95% on 2 L oxygen.  Weight 86.2 kg. 

GENERAL:  The patient is currently alert, awake, follows command.  Appears 
weak.  No

obvious acute distress. 

HEENT:  Head; normocephalic, atraumatic.  Eyes; pupils are round and reactive to

light.  Extraocular muscle intact.  ENT; oropharynx within normal limits.  Moist

mucous membranes.  No oral lesion.  No pharyngeal erythema.  No exudate. 

NECK:  Supple.  No thyromegaly.  No carotid bruit.  No jugular venous 
distention. 

LUNGS:  Clear to auscultation without any rhonchi or rales. 

CARDIAC:  S1 and S2 appears regular.  No murmur.  No gallop.  No rub. 

ABDOMEN:  Soft.  Bowel sounds present.  Nontender.  Nondistended.  No 
organomegaly.

No mass.  No suprapubic tenderness. 

BACK:  Unremarkable.  No CVA tenderness. 

EXTREMITIES:  Upper extremity, AV fistula on the left upper extremity with 
palpable

thrill.  Lower extremity, no edema.  Good distal pulsation. 

SKIN:  No skin rash. 

HEMATOLOGIC:  No lymphadenopathy.



LABORATORY DATA:  Significant labs:  CBC; WBC 4.0, hemoglobin 9.5, and platelets

227.  BMP; sodium 131, potassium 3.6, chloride 91, carbon dioxide 29, BUN 28,

creatinine 6.40, glucose 123, and calcium 8.2.  LFT; AST 13, ALT less than 7,

alkaline phosphatase 62, and albumin 3.3.  Troponin I 0.010.  BNP 1660. 



ASSESSMENT AND PLAN:  Impression:

1. Acute hypotension, suspecting from over-medication.  The patient does not 
have

any chest pain or any dyspnea and thromboembolic disorder is extremely 
unlikely.  We

will check cosyntropin stim test to rule out any adrenal insufficiency.  The

patient's blood pressure already improved with IV fluid. 

2. Generalized weakness.  We will monitor while in hospital, most likely related

with hypotension.  We will check orthostatic vitals.  We will rule out cardiac

etiology with serial cardiac enzyme x3.  We will check lactic acid level. 

3. End-stage renal disease, on hemodialysis.  Dr. Posadas will be consulted for

maintenance hemodialysis. 

4. Leukopenia.  The patient had leukopenia for a period of time, this is not a 
new

finding. 

5. Anemia of renal disease.  Nephro-Fish one tablet will be started.  This is 
also a

chronic finding.  Hyponatremia is also a chronic finding related with renal 
failure. 

6. Elevated BNP, likely due to renal failure and this is also a chronic finding.

The patient already had echocardiography in October 2018, which showed diastolic

dysfunction. 

7. History of hypertension.  At this point, because of low blood pressure we 
will

hold on antihypertensive medication and titrate blood pressure medication as 
needed. 

8. Gastroesophageal reflux disease.  Continue Pepcid 20 mg daily.

9. Gout.  Continue allopurinol 100 mg daily as per home dosage.

10. Diabetes, type 2.  Insulin as per sliding scale protocol.  We will start 
insulin

once we verify his home medication. 

11. Dyslipidemia.  Continue Lipitor 10 mg p.o. at bedtime.

12. Deep venous thrombosis prophylaxis is not needed because we are expecting

discharge in 24 hours. 

13. Gastrointestinal prophylaxis, Pepcid 20 mg p.o. daily.



CODE STATUS:  The patient is a full code.  The patient does not have any 
surrogate

decision maker. 



DISPOSITION:  Disposition plan within 24 hours.



PLAN:  Plan of care discussed with the patient in detail.







Job ID:  [id number]



MTDD

## 2018-12-05 NOTE — PRG
DATE OF SERVICE:  12/05/2018



SERVICE:  Renal Medicine.



SUBJECTIVE:  Mr. Scott is a 58-year-old black male with ESRD and readmitted for

generalized malaise with some degree of hypoxemia.  He was felt to be volume

depleted at that time. 



He was given volume with improvement of his blood pressure.  He is currently

undergoing dialysis today with minimal fluid removal.  He is feeling a little

better.  His weakness is slightly improved.  The patient is receiving hemodialysis

today and doing well. 



OBJECTIVE:  VITAL SIGNS:  Blood pressure is 117/70, heart rate 70, and respiratory

rate 12. 

GENERAL:  Awake, somewhat lethargic, not in distress. 

SKIN:  Adequate turgor. 

HEENT:  Slightly pale conjunctivae.  Anicteric sclerae.  No neck mass.  No carotid

bruits.  No JVD. 

CHEST:  No deformities. 

LUNGS:  Clear breath sounds. 

HEART:  Normal sinus rhythm.  No murmurs.  No gallops.  No rubs. 

ABDOMEN:  Globular, soft, and nontender. 

EXTREMITIES:  No edema.  No deformities.



MEDICATIONS:  Medications of December 5, 2018, reviewed.



LABORATORY DATA:  Laboratories of December 5, 2018; glucose 126, sodium 131,

potassium 3.6, chloride 91, carbon dioxide 29, BUN 28, creatinine 6.4.  AST 13, ALT

7, and albumin 3.3.  BNP 1660. 



ASSESSMENT AND PLAN:  

1. Hypoxemia, initially felt to be some degree of volume overload.  We are dialyzing

this patient.  We are attempting fluid removal only as tolerated due to the previous

hypotensive episode. 

2. Hypotension, much improved.  Currently, blood pressure is within normal.  Please

note, he had a cardiac echo done recently which showed a normal EF.  We also checked

cortisol level with this patient.  His baseline is noted to be 13.  For that reason,

we did not pursue the cortisol stimulation test. 

3. Anemia.  Start Epogen next week. 



Agree with current management.







Job ID:  [id number]

## 2018-12-06 NOTE — PDOC.PN
- Subjective


Encounter Start Date: 12/06/18


Encounter Start Time: 08:00





Patient seen and examined. No new complaints. No overnight events





- Objective


Resuscitation Status - Order Detail:





12/05/18 09:14


Resuscitation Status Routine 


   Resuscitation Status: FULL: Full Resuscitation








MAR Reviewed: Yes


Vital Signs & Weight: 


 Vital Signs (12 hours)











  Temp Pulse Resp BP BP BP BP


 


 12/06/18 07:55  98.1 F  63  16   145/65 H  140/65  140/64


 


 12/06/18 03:12  99.6 F  67  16  127/61   


 


 12/05/18 23:15  98.7 F  65  16  126/61   














  Pulse Ox


 


 12/06/18 07:55  95


 


 12/06/18 03:12  94 L


 


 12/05/18 23:15  95








 Weight











Weight                         185 lb 3.2 oz














I&O: 


 











 12/05/18 12/06/18 12/07/18





 06:59 06:59 06:59


 


Intake Total  830 


 


Output Total  1250 


 


Balance  -420 











Result Diagrams: 


 12/06/18 03:58





 12/06/18 03:58


Additional Labs: 


 Accuchecks











  12/05/18 12/05/18 12/05/18





  20:33 16:43 11:10


 


POC Glucose  146 H  81  99











EKG Reviewed by me: Yes





Phys Exam





- Physical Examination


Constitutional: NAD


HEENT: PERRLA, moist MMs, sclera anicteric


Neck: no JVD, supple


Respiratory: no wheezing, no rales, no rhonchi


Cardiovascular: RRR, no significant murmur, no rub


Gastrointestinal: soft, non-tender, no distention, positive bowel sounds


Musculoskeletal: no edema, pulses present


Neurological: non-focal, normal sensation


Lymphatic: no nodes


Psychiatric: normal affect, A&O x 3


Skin: no rash, normal turgor





Dx/Plan


(1) Hypotension due to drugs


Status: Acute   





(2) Anemia of renal disease


Code(s): D63.1 - ANEMIA IN CHRONIC KIDNEY DISEASE   Status: Chronic   





(3) Chronic hepatitis C


Code(s): B18.2 - CHRONIC VIRAL HEPATITIS C   Status: Chronic   


Qualifiers: 


 


Comment: stable   





(4) DM type 2 (diabetes mellitus, type 2)


Status: Chronic   





(5) ESRD (end stage renal disease) on dialysis


Code(s): N18.6 - END STAGE RENAL DISEASE; Z99.2 - DEPENDENCE ON RENAL DIALYSIS 

  Status: Chronic   Comment: dialysis per nephrology   





(6) Gout


Code(s): M10.9 - GOUT, UNSPECIFIED   Status: Chronic   


Qualifiers: 


 


Comment: stable   





(7) Hypertension


Code(s): I10 - ESSENTIAL (PRIMARY) HYPERTENSION   Status: Chronic   


Qualifiers: 


 


Comment: Increase scheduled hydralazine to 50 mg PO TID yesterday, continue to 

monitor vital signs and titrate antihypertensives as needed   





(8) Secondary hyperparathyroidism of renal origin


Code(s): N25.81 - SECONDARY HYPERPARATHYROIDISM OF RENAL ORIGIN   Status: 

Chronic   





- Plan


cont current plan of care





* medication adjusted


* stable for discharge


* medication reviewed as below


* symptomatic treatment.








Review of Systems





- Review of Systems


ENT: negative: Ear Pain, Ear Discharge, Nose Pain, Nose Discharge, Nose 

Congestion, Mouth Pain, Mouth Swelling, Throat Pain, Throat Swelling, Other


Respiratory: negative: Cough, Dry, Shortness of Breath, Hemoptysis, SOB with 

Excertion, Pleuritic Pain, Sputum, Wheezing


Cardiovascular: negative: chest pain, palpitations, orthopnea, paroxysmal 

nocturnal dyspnea, edema, light headedness, other


Gastrointestinal: negative: Nausea, Vomiting, Abdominal Pain, Diarrhea, 

Constipation, Melena, Hematochezia, Other


Genitourinary: negative: Dysuria, Frequency, Incontinence, Hematuria, Retention

, Other


Musculoskeletal: negative: Neck Pain, Shoulder Pain, Arm Pain, Back Pain, Hand 

Pain, Leg Pain, Foot Pain, Other


Skin: negative: Rash, Lesions, Jae, Bruising, Other





- Medications/Allergies


Allergies/Adverse Reactions: 


 Allergies











Allergy/AdvReac Type Severity Reaction Status Date / Time


 


No Known Drug Allergies Allergy   Verified 11/02/15 08:35











Medications: 


 Current Medications





Acetaminophen (Tylenol)  650 mg PO Q4H PRN


   PRN Reason: Headache/Fever/Mild Pain (1-3)


Hydrocodone Bitart/Acetaminophen (Norco 5/325)  1 tab PO Q4H PRN


   PRN Reason: Moderate Pain (4-6)


Artificial Tears (Tears Naturale)  2 drop EA EYE PRN PRN


   PRN Reason: Dry Eyes


Bisacodyl (Dulcolax)  10 mg PO DAILYPRN PRN


   PRN Reason: Constipation


Bisacodyl (Dulcolax)  10 mg OK DAILYPRN PRN


   PRN Reason: Constipation


Calcium Carbonate (Tums)  1,000 mg PO Q4H PRN


   PRN Reason: Heartburn  or Indigestion


Dextrose/Water (Dextrose 50%)  25 gm SLOW IVP PRN PRN


   PRN Reason: Hypoglycemia


Famotidine (Pepcid)  20 mg PO DAILY [facility]


   Last Admin: 12/06/18 08:41 Dose:  20 mg


Glucagon (Glucagon)  1 mg IM PRN PRN


   PRN Reason: Hypoglycemia


Guaifenesin (Robitussin Sf)  200 mg PO Q4H PRN


   PRN Reason: Cough


Hydralazine HCl (Apresoline)  10 mg SLOW IVP Q4H PRN


   PRN Reason: SBP > 180 and HR < 70


Dextrose/Water (D5w)  1,000 mls @ 0 mls/hr IV .Q0M PRN


   PRN Reason: Hypoglycemia


Insulin Human Lispro (Humalog)  0 units SC .MODERATE SLIDING SC PRN


   PRN Reason: Moderate Correctional Scale


Insulin Human Lispro (Humalog)  0 units SC .BEDTIME SLIDING SC PRN


   PRN Reason: Bedtime Correctional Scale


Loperamide HCl (Imodium)  2 mg PO PRN PRN


   PRN Reason: Diarrhea/Loose Stools


Loratadine (Claritin)  10 mg PO DAILYPRN PRN


   PRN Reason: Sinus Symptoms


Mineral Oil/White Petrolatum (Eucerin Cream)  0 gm TOP BIDPRN PRN


   PRN Reason: Dry Skin


Ondansetron HCl (Zofran Odt)  4 mg PO Q6H PRN


   PRN Reason: Nausea/Vomiting


Ondansetron HCl (Zofran)  4 mg IVP Q6H PRN


   PRN Reason: Nausea/Vomiting


Senna/Docusate Sodium (Senokot S)  2 tab PO BID PRN


   PRN Reason: Constipation


Sodium Chloride (Ocean Nasal Spray 0.65%)  0 ml EA NARE QIDPRN PRN


   PRN Reason: Nasal Congestion


Throat Lozenges (Cepastat Lozenges)  1 jaya PO Q2H PRN


   PRN Reason: Sore Throat


Vitamin B Complex/Vit C/Folic Acid (Nephro-Fish Tablet)  1 tab PO DAILY [facility]


   Last Admin: 12/06/18 08:41 Dose:  1 tab

## 2018-12-06 NOTE — DIS
DATE OF ADMISSION:  12/05/2018



DATE OF DISCHARGE:  12/06/2018



PRIMARY CARE PHYSICIAN:  [facility].



DISCHARGE DISPOSITION:  Home.



PRIMARY DISCHARGE DIAGNOSIS:  Hypotension likely due to medication, resolved.



SECONDARY DISCHARGE DIAGNOSES:  Anemia of renal disease; secondary

hyperparathyroidism of renal origin; end-stage renal disease, on hemodialysis;

chronic hepatitis C; diabetes type 2; gout; hypertension. 



PRIMARY PROCEDURE/OPERATION:  Maintenance hemodialysis.



RADIOLOGICAL INVESTIGATION:  Chest x-ray normal.



SIGNIFICANT LABORATORY DATA:  Hemoglobin 10.4, WBC 3.3, platelet 210.  Sodium 135,

creatinine 3.85, potassium 3.9.  Liver enzymes normal.  Albumin 3.3.  Cardiac enzyme

negative.  ACTH stimulation test negative. 



DISCHARGE MEDICATIONS:  The patient was not able to bring his home medication, but

we have made adjustment from previous medication.  Tylenol No. 3 one tablet b.i.d.

p.r.n., allopurinol 300 mg p.o. daily, aspirin 81 mg p.o. daily, Nexium 40 mg daily,

Lantus insulin 8 units subcu daily, fish oil one capsule daily, Lipitor 10 mg p.o.

at bedtime, Coreg 25 mg p.o. b.i.d., ferrous sulfate 325 mg p.o. daily, hydralazine

25 mg p.o. b.i.d., Imdur 60 mg daily, MiraLAX 17 g p.o. daily. 



We advised this patient to start all this medication as tolerated if blood pressure

permits and if blood pressure is more than 130/80. 



CONTRAINDICATION:  None.



CODE STATUS:  Full code.



INPATIENT CONSULTANT:  Dr. Posadas.



TEST RESULTS PENDING ON DISCHARGE:  None.



ALLERGIES:  NO KNOWN DRUG ALLERGY.



DISCHARGE PLAN:  Post hospital, the patient is instructed to follow up with primary

care physician in 1 week.  The patient is advised to keep blood pressure diary, to

see primary care physician. 



HOSPITAL COURSE:  A 58-year-old male who was having generalized weakness.  He was

hypotensive.  He was taking bunch of blood pressure medication that was contributing

to his blood pressure low.  He was not able to go for dialysis because of feeling

weakness, and he came to emergency room.  His blood pressure was low, but that

improved with IV fluid.  His orthostatic vitals were negative.  We have made some

adjustment in his blood pressure medication.  We discontinued Procardia XL,

hydralazine reduced to b.i.d., Coreg reduced to b.i.d., and Imdur also reduced to

daily. 



The patient did not bring his home medication, so unable to verify his home

medication.  We advised necessary patient education about hypotension and monitoring

blood pressure and followup with PCP. 



The patient is seen and examined at bedside today.  Please see my progress note from

today for further detail. 







Job ID:  [id number]

## 2018-12-07 NOTE — PRG
DATE OF SERVICE:  12/07/2018



SUBJECTIVE:  Mr. Scott is a 58-year-old black male with ESRD, was admitted for

generalized malaise.  Workup has been negative.  His cortisol was noted to be

normal.  This morning, he is feeling better.  He is scheduled for a dialysis today.

Our plan is fluid removal only as tolerated by the patient.  No complaints of chest

pain or shortness of breath. 



OBJECTIVE:  VITAL SIGNS:  Blood pressure 161/73, heart rate 68, respiratory rate 18,

temperature 98.3, and pulse oximetry 94%. 

GENERAL:  Awake, alert, comfortable, not in distress. 

SKIN:  Adequate turgor. 

HEENT:  He has a pinkish conjunctivae.  Anicteric sclerae. 

NECK:  No neck mass.  No carotid bruits.  No JVD. 

CHEST:  No deformities. 

LUNGS:  Decreased breath sounds. 

HEART:  Normal sinus rhythm.  No murmurs, gallops, or rubs. 

ABDOMEN:  Globular, soft, nontender.  No masses. 

EXTREMITIES:  No edema.



MEDICATIONS:  Medications of December 7, 2018, reviewed.



LABORATORY DATA:  Laboratories of December 6, 2018, white count 3.3, hemoglobin

10.4.  Sodium 135, potassium 3.9, chloride 97, carbon dioxide 31, BUN 13, creatinine

3.85, and albumin 3.3. 



ASSESSMENT AND PLAN:  

1. End-stage renal disease, stable.  We will continue current hemodialysis regimen.

The patient will be scheduled for his regular Monday, Wednesday, and Friday 

hemodialysis.  We will attempt fluid removal as tolerated by the patient.  We will

remove between 1 and 1.2 L of fluid/dialysis. 

2. Anemia, stable.

3. Generalized malaise, negative.  Nonspecific etiology.  Cortisol levels are

normal.  Agree with plan discharge after dialysis. 





Job ID:  [id number]

## 2018-12-07 NOTE — DIS
DATE OF ADMISSION:  12/05/2018



DATE OF DISCHARGE:  12/07/2018



ADDENDUM:  

This patient was planned for discharge yesterday.  Please see my discharge summary

dictated yesterday.  No change happened, but Dr. Posadas wanted to keep him one more day

for dialysis.  The patient is getting hemodialysis this morning, and subsequently,

he will be discharged to home.  Please see my progress note for further details. 







Job ID:  [id number]

## 2018-12-07 NOTE — PDOC.PN
- Subjective


Encounter Start Date: 12/07/18


Encounter Start Time: 07:45





Patient seen and examined. No new complaints. No overnight events





- Objective


Resuscitation Status - Order Detail:





12/05/18 09:14


Resuscitation Status Routine 


   Resuscitation Status: FULL: Full Resuscitation








MAR Reviewed: Yes


Vital Signs & Weight: 


 Vital Signs (12 hours)











  Temp Pulse Resp BP Pulse Ox


 


 12/07/18 07:50  98.3 F  68  18  161/73 H  94 L


 


 12/07/18 03:15  99.3 F  74  18  161/72 H  93 L


 


 12/06/18 23:20  99.4 F  67  16  173/72 H  94 L








 Weight











Weight                         185 lb 6.4 oz














I&O: 


 











 12/06/18 12/07/18 12/08/18





 06:59 06:59 06:59


 


Intake Total 830 1180 


 


Output Total 1250 50 


 


Balance -420 1130 











Result Diagrams: 


 12/06/18 03:58





 12/06/18 03:58


Additional Labs: 


 Accuchecks











  12/07/18 12/06/18 12/06/18





  05:51 20:24 16:46


 


POC Glucose  90  110  81














  12/06/18





  10:22


 


POC Glucose  125 H











EKG Reviewed by me: Yes





Phys Exam





- Physical Examination


Constitutional: NAD


HEENT: PERRLA, moist MMs, sclera anicteric


Neck: no JVD, supple


Respiratory: no wheezing, no rales, no rhonchi


Cardiovascular: RRR, no significant murmur, no rub


Gastrointestinal: soft, non-tender, no distention, positive bowel sounds


Musculoskeletal: no edema, pulses present


Neurological: non-focal, normal sensation


Psychiatric: normal affect, A&O x 3


Skin: no rash, normal turgor





Dx/Plan


(1) Hypotension due to drugs


Status: Acute   





(2) Anemia of renal disease


Code(s): D63.1 - ANEMIA IN CHRONIC KIDNEY DISEASE   Status: Chronic   





(3) Chronic hepatitis C


Code(s): B18.2 - CHRONIC VIRAL HEPATITIS C   Status: Chronic   


Qualifiers: 


 


Comment: stable   





(4) DM type 2 (diabetes mellitus, type 2)


Status: Chronic   





(5) ESRD (end stage renal disease) on dialysis


Code(s): N18.6 - END STAGE RENAL DISEASE; Z99.2 - DEPENDENCE ON RENAL DIALYSIS 

  Status: Chronic   Comment: dialysis per nephrology   





(6) Gout


Code(s): M10.9 - GOUT, UNSPECIFIED   Status: Chronic   


Qualifiers: 


 


Comment: stable   





(7) Hypertension


Code(s): I10 - ESSENTIAL (PRIMARY) HYPERTENSION   Status: Chronic   


Qualifiers: 


 


Comment: Increase scheduled hydralazine to 50 mg PO TID yesterday, continue to 

monitor vital signs and titrate antihypertensives as needed   





(8) Secondary hyperparathyroidism of renal origin


Code(s): N25.81 - SECONDARY HYPERPARATHYROIDISM OF RENAL ORIGIN   Status: 

Chronic   





- Plan


cont current plan of care





* medication reviewed as below


* symptomatic treatment


* spoke with nephrology


* will discharge after HD


* stable.








Review of Systems





- Review of Systems


ENT: negative: Ear Pain, Ear Discharge, Nose Pain, Nose Discharge, Nose 

Congestion, Mouth Pain, Mouth Swelling, Throat Pain, Throat Swelling, Other


Respiratory: negative: Cough, Dry, Shortness of Breath, Hemoptysis, SOB with 

Excertion, Pleuritic Pain, Sputum, Wheezing


Cardiovascular: negative: chest pain, palpitations, orthopnea, paroxysmal 

nocturnal dyspnea, edema, light headedness, other


Gastrointestinal: negative: Nausea, Vomiting, Abdominal Pain, Diarrhea, 

Constipation, Melena, Hematochezia, Other


Genitourinary: negative: Dysuria, Frequency, Incontinence, Hematuria, Retention

, Other


Musculoskeletal: negative: Neck Pain, Shoulder Pain, Arm Pain, Back Pain, Hand 

Pain, Leg Pain, Foot Pain, Other





- Medications/Allergies


Allergies/Adverse Reactions: 


 Allergies











Allergy/AdvReac Type Severity Reaction Status Date / Time


 


No Known Drug Allergies Allergy   Verified 11/02/15 08:35











Medications: 


 Current Medications





Acetaminophen (Tylenol)  650 mg PO Q4H PRN


   PRN Reason: Headache/Fever/Mild Pain (1-3)


Hydrocodone Bitart/Acetaminophen (Norco 5/325)  1 tab PO Q4H PRN


   PRN Reason: Moderate Pain (4-6)


Artificial Tears (Tears Naturale)  2 drop EA EYE PRN PRN


   PRN Reason: Dry Eyes


Bisacodyl (Dulcolax)  10 mg PO DAILYPRN PRN


   PRN Reason: Constipation


Bisacodyl (Dulcolax)  10 mg [place] DAILYPRN PRN


   PRN Reason: Constipation


Calcium Carbonate (Tums)  1,000 mg PO Q4H PRN


   PRN Reason: Heartburn  or Indigestion


Dextrose/Water (Dextrose 50%)  25 gm SLOW IVP PRN PRN


   PRN Reason: Hypoglycemia


Famotidine (Pepcid)  20 mg PO DAILY [facility]


   Last Admin: 12/07/18 08:47 Dose:  20 mg


Glucagon (Glucagon)  1 mg IM PRN PRN


   PRN Reason: Hypoglycemia


Guaifenesin (Robitussin Sf)  200 mg PO Q4H PRN


   PRN Reason: Cough


Hydralazine HCl (Apresoline)  10 mg SLOW IVP Q4H PRN


   PRN Reason: SBP > 180 and HR < 70


Dextrose/Water (D5w)  1,000 mls @ 0 mls/hr IV .Q0M PRN


   PRN Reason: Hypoglycemia


Insulin Human Lispro (Humalog)  0 units SC .MODERATE SLIDING SC PRN


   PRN Reason: Moderate Correctional Scale


Insulin Human Lispro (Humalog)  0 units SC .BEDTIME SLIDING SC PRN


   PRN Reason: Bedtime Correctional Scale


Loperamide HCl (Imodium)  2 mg PO PRN PRN


   PRN Reason: Diarrhea/Loose Stools


Loratadine (Claritin)  10 mg PO DAILYPRN PRN


   PRN Reason: Sinus Symptoms


Mineral Oil/White Petrolatum (Eucerin Cream)  0 gm TOP BIDPRN PRN


   PRN Reason: Dry Skin


Ondansetron HCl (Zofran Odt)  4 mg PO Q6H PRN


   PRN Reason: Nausea/Vomiting


Ondansetron HCl (Zofran)  4 mg IVP Q6H PRN


   PRN Reason: Nausea/Vomiting


Senna/Docusate Sodium (Senokot S)  2 tab PO BID PRN


   PRN Reason: Constipation


Sodium Chloride (Ocean Nasal Spray 0.65%)  0 ml EA NARE QIDPRN PRN


   PRN Reason: Nasal Congestion


Throat Lozenges (Cepastat Lozenges)  1 jaya PO Q2H PRN


   PRN Reason: Sore Throat


Vitamin B Complex/Vit C/Folic Acid (Nephro-Fish Tablet)  1 tab PO DAILY [facility]


   Last Admin: 12/07/18 08:47 Dose:  1 tab

## 2018-12-08 NOTE — EKG
Test Reason : 

Blood Pressure : ***/*** mmHG

Vent. Rate : 063 BPM     Atrial Rate : 063 BPM

   P-R Int : 216 ms          QRS Dur : 086 ms

    QT Int : 474 ms       P-R-T Axes : 033 034 081 degrees

   QTc Int : 485 ms

 

Sinus rhythm with 1st degree A-V block

Nonspecific T wave abnormality

Prolonged QT

Abnormal ECG

 

Confirmed by JOHANNA SOSA DO ([id number]),  CINDI ESPINOZA ([id number]) on 12/8/2018 1:47:27 PM

 

Referred By:             Confirmed By:JOHANNA SOSA DO

## 2018-12-27 NOTE — RAD
CHEST 2 VIEWS:

 

HISTORY: 

Wheezing.

 

COMPARISON: 

Radiograph 12/5/2018.

 

FINDINGS: 

Heart size is mildly enlarged.  Mild pulmonary venous congestion.  No pneumothorax.  Small left effus
ion.  No acute osseous abnormality.  Right upper quadrant surgical clips.

 

IMPRESSION: 

Cardiomegaly and mild pulmonary venous congestion and small effusions.

 

POS: [facility]

## 2019-01-03 NOTE — CT
BRAIN CT WITHOUT IV CONTRAST:

1/3/19

 

HISTORY: 

58-year-old male with history of injury from a fall and weakness.

 

COMPARISON:  

2/20/15.

 

The head is rotated to the right and cocked to the side with considerable side to side asymmetry. The
re is no focal mass or midline. No intra or extra-axial hemorrhage. Small punctate bilateral basal la
cunar infarcts. Stable appearance from prior study.

 

IMPRESSION:  

Atrophy and chronic white matter ischemic change with small bilateral basal ganglia lacunar infarcts.
 No mass or bleed or other acute process. Stable from prior study. 

 

 

POS: [facility]

## 2019-02-06 NOTE — CT
CT BRAIN NONCONTRAST:

 

DATE: 

2/6/19 at 3:28 p.m.

 

HISTORY:

58-year-old male with altered mental status, status post fall with possible trauma to the head. 

 

COMPARISON:

1/3/1 9

 

FINDINGS:

There is moderate dilation of the lateral and third ventricles, and mild dilation of the fourth ventr
icle. The ventriculomegaly is slightly out of proportion to the brain parenchymal volume loss. There 
are confluent, somewhat severe hypodensities symmetrically throughout the periventricular white matte
r, extending into the deep white matter and encroaching upon the subcortical white matter. At least m
ost of this represents advanced chronic ischemic white matter changes due to microvascular atheroscle
rosis. It is possible some of this could represent transependymal migration of CSF. There is a tiny o
ld lacunar infarction in the anterior limb of the right internal capsule. There is no mass effect, mi
dline shift, or extra-axial fluid collection. No acute intra-axial or extra-axial hemorrhage. No calv
arial fracture. No interval change overall.

 

IMPRESSION:

1.      No acute intracranial findings. 

2.      Severe chronic ischemic white matter changes. 

3.      Ventriculomegaly. This is probably on the basis of involutional changes of the brain. Alterna
tively, there is a small possibility that this could represent normal pressure hydrocephalus. Clinica
l correlation is recommended (is there dementia, urinary incontinence, and/or gait apraxia?).

4.      No interval change compared to 1/3/19. 

 

 

LINDY CROCKER

 

POS: [facility]

## 2019-02-06 NOTE — RAD
CHEST 1 VIEW:

 

Date:  02/06/19 

 

INDICATION:

Found unresponsive by . 

 

COMPARISON:  

Prior exam dated 12/27/18. 

 

FINDINGS:

Mild cardiomegaly is stable. Mild pulmonary vascular congestion is present. No pleural effusion or pn
eumothorax is evident. No acute osseous abnormality is evident. 

 

IMPRESSION: 

Cardiomegaly with mild pulmonary vascular congestion. 

 

 

POS: [facility]

## 2019-02-06 NOTE — HP
PRIMARY CARE PHYSICIAN:  [facility].



CHIEF COMPLAINT:  Fall from standing and confusion.



HISTORY OF PRESENT ILLNESS:  This is a 58-year-old  male with a

known history of end-stage renal disease, who has not been to his dialysis for 2

weeks.  He was found down on the floor in his bathroom by his .  Dry feces

were noted on him at that time, unknown how long he had been down and EMS was

called.  His blood pressure was found to be in the 200s.  He was brought into the

emergency room.  The patient has been confused in the emergency room.  He is awake

and talking and only knows that he is at [facility], but does not

know the day or time.  Does not know the year.  Does not know why he is here and

cannot give any recent history.  Denies any active complaints. 



PAST MEDICAL HISTORY:  All medical history is taken from the ER chart and the

computer chart as the patient is unable to give us any information at this time. 

1. End-stage renal disease, on hemodialysis, started in 2017.

2. Hypertension with recent admission for hypotension and blood pressure medicines

have been weaned down some. 

3. Dyslipidemia.

4. Diabetes mellitus type 2.

5. Chronic diastolic congestive heart failure.

6. Chronic hepatitis C.

7. Gout.



PAST SURGICAL HISTORY:  

1. Cardiac catheterization with angioplasty.

2. Cataract surgery.

3. Hemodialysis access with a left upper arm AV fistula.



PAST PSYCHIATRIC HISTORY:  None.



SOCIAL HISTORY:  The patient lives alone, is typically independent for daily

activities of living.  Per chart, no history of tobacco, alcohol, or illicit drug

use.  Quit smoking 30 years ago. 



FAMILY HISTORY:  Hypertension, diabetes, and heart disease in several family members.



ALLERGIES:  NO KNOWN DRUG ALLERGIES.



CURRENT MEDICATIONS:  Uncertain what he has been taking recently; from his discharge

medications at his last hospitalization; 

1. Allopurinol 300 mg daily.

2. Aspirin 81 mg daily.

3. Nexium 40 mg daily.

4. Lantus 8 units subcu daily.

5. Fish oil one capsule daily.

6. Lipitor 10 mg at bedtime.

7. Coreg 25 mg twice a day.

8. Ferrous sulfate 325 mg daily.

9. Hydralazine 25 mg twice a day.

10. Imdur 60 mg daily.

11. MiraLAX 17 g daily.

12. Tylenol No. 3 as needed for pain.



REVIEW OF SYSTEMS:  Unable to obtain secondary to the patient's altered mental

status.  He says no to questions about all review of systems. 



PHYSICAL EXAMINATION:

VITAL SIGNS:  Blood pressure 199/93, pulse 78, respirations 20, temperature 98.8, O2

saturation 93% on room air. 

GENERAL:  This is a well-developed, well-nourished  male, in no

acute distress. 

HEENT:  Pupils equal, round, and reactive to light.  Oropharynx is clear without

lesions, erythema, or exudate.  He does have dentures in place. 

NECK:  Supple.  No lymphadenopathy.  No thyroid nodules or enlargement.  He does

have significant JVD. 

HEART:  Regular rate and rhythm.  No murmurs, rubs, or gallops. 

LUNGS:  Clear to auscultation bilaterally.  No wheezes, crackles, or rhonchi. 

ABDOMEN:  Soft.  Mild tenderness to palpation diffusely across the lower abdomen

without any guarding.  No rebound tenderness.  No hepatosplenomegaly or other

masses. 

EXTREMITIES:  No clubbing, cyanosis, or edema.  He does have thrill in the AV

fistula on his left upper extremity. 

SKIN:  No rashes or other lesions noted. 

NEUROLOGIC:  The patient is moving both sides equally, has no facial droop.

Strength is intact in upper and lower extremities.  Reflexes equal bilaterally. 

PSYCHIATRIC:  Alert, oriented to person and to place, not to time or situation.



LABORATORY DATA:  CBC with a hemoglobin of 13 and hematocrit of 41; the rest was

normal.  Complete metabolic panel notable for carbon dioxide of 19, anion gap 27,

BUN of 60, creatinine of 6.18, glucose of 117, total bilirubin of 2.2; the rest was

normal.  C-reactive protein was elevated at 9.9, creatine kinase was normal at 90.

CT of the brain done in the emergency room shows no acute intracranial findings.

There are some severe chronic ischemic white matter changes though and some

ventriculomegaly likely due to involution of the brain.  A rare possibility this

could be normal-pressure hydrocephalus.  No interval changes from previous CT scans. 



Chest x-ray, I did review the chest x-ray done in the emergency room along with the

radiologist report.  This does show some cardiomegaly and mild vascular congestion.

No shiva pulmonary edema.  EKG, the patient has normal sinus rhythm in EKG.  He does

have some evidence of mild right ventricular conduction delay and evidence of left

ventricular hypertrophy.  No ST-segment changes.  No T-wave peaking or inversions. 



ASSESSMENT:  

1. Altered mental status, likely secondary to uremia.  The patient does not have any

other abnormalities on exam that would explain this.  No evidence at this time of

acute stroke.  There is an outside possibility that he has some hypertensive

emergency with hypertensive encephalopathy.  The patient is being put in the

hospital for dialysis, plans for dialysis tomorrow morning and we will have to see

if his mental status clears with dialysis. 

2. Hypertensive emergency.  The patient's blood pressure has been running in the low

200s systolic.  We have given him several of his home blood pressure medications and

still wait for pharmacy to send the other ones down to help bring this down.  He has

had clonidine and hydralazine and his Coreg, still waiting for the nifedipine from

the pharmacy.  I believe he has also had isosorbide.  If the patient's blood

pressure remains up and we are unable to control it, he may end up needing to be put

in the IMCU with a nitroglycerin drip in order to keep this under control, though he

can get his dialysis. 

3. Diabetes mellitus type 2.  For now, we will just check his fingerstick blood

sugars q.a.c. and at bedtime.  We will reinstitute his insulin once he starts

eating. 

4. Dyslipidemia.  We will resume the patient's statin.

5. GI prophylaxis.  We will resume his proton pump inhibitor.

6. Deep venous thrombosis prophylaxis.  Put patient on SCDs while in bed.



CODE STATUS:  The patient is unable to have any sort of discussion about code status

at this time due to his altered mental status.  Per the chart, patient's next of kin

would be his wife, Letty Scott, however, she does not live with him.  She lives in

[place].  There is some concern about his living situation.  He may end up needing to

go to a nursing home eventually. 







Job ID:  [id number]

## 2019-02-06 NOTE — CON
DATE OF CONSULTATION:  



HISTORY OF PRESENT ILLNESS:  Mr. Scott is a 58-year-old black male, who is a dialysis

patient - ESRD on maintenance hemodialysis and was found unresponsive at home.

According to the patient, he has been progressively weak for the last several days.

Of note, he lives alone.  His p.o. intake is much decreased.  Initial evaluation

showed a chest x-ray with increased lung markings.  In addition, a CT scan of the

brain was done and no acute intracranial abnormalities were noted.  So far, the

etiology of his syncopal episode is unclear.  We are now being consulted for his

maintenance hemodialysis. 



REVIEW OF SYSTEMS:  Positive for syncopal episode, decreased appetite, and

occasional nausea.  No abdominal pain.  No chest pain.  No shortness of breath.  No

headache.  No diplopia.  No fever or chills.  No dysuria.  No urinary frequency.  No

hematochezia.  No melena.  No hematemesis. 



HOME MEDICATIONS:  Include;

1. Hydralazine 25 mg p.o. b.i.d.

2. MiraLAX 17 g daily.

3. Fish oil 1000 mg p.o. b.i.d.

4. Imdur 60 mg daily.

5. Insulin glargine 8 units subcu q.a.m.

6. Ferrous sulfate 325 mg q.a.m.

7. Nexium 40 mg daily.

8. Carvedilol 25 mg p.o. b.i.d.

9. Atorvastatin 10 mg at bedtime.

10. Aspirin 81 mg tablet once a day.

11. Allopurinol 300 mg daily.



PAST MEDICAL HISTORY:  

1. ESRD secondary to diabetic nephropathy, currently on maintenance hemodialysis.

2. Type 2 diabetes mellitus.

3. Coronary artery disease.

4. Status post CHF.

5. Depression.

6. GERD.

7. Hypertension.

8. Gout.

9. Hyperlipidemia.

10. Chronic anemia.



PAST SURGICAL HISTORY:  Status post cardiac cath with coronary stent placement,

status post colonoscopy, status post eye surgery, status post cuffed dialysis

catheter placement, status post AV fistula. 



SOCIAL HISTORY:  The patient lives in [place].  He currently lives alone.  He is a

retired worker for a meat shop.  He is originally from [place].  He has 6 children.  He

is .  Education, GED.  Smoked for 20 years 1 pack a day.  Currently, no

history of smoking or alcohol.  Status post multiple blood transfusion.  No alcohol

use. 



ALLERGIES:  NONE.



TRAUMA:  None.



IMMUNIZATION:  Up-to-date.



HOSPITALIZATIONS:  Please see past medical history.



FAMILY HISTORY:  No family history of ESRD.



PHYSICAL EXAMINATION:

VITAL SIGNS:  Blood pressure 170/70 with heart rate of 70, respiratory rate 14, and

temperature 98.2. 

GENERAL:  The patient is awake, lethargic, not in overt distress. 

SKIN:  Adequate turgor. 

HEENT:  He has a pinkish conjunctivae.  Anicteric sclerae.  No neck mass.  No

carotid bruits.  No JVD. 

CHEST:  No deformities. 

LUNGS:  Clear breath sounds.  No wheezing.  No crackles. 

HEART:  Normal sinus rhythm.  No murmurs, gallops, or rubs. 

ABDOMEN:  Globular, soft, and nontender.  No masses. 

EXTREMITIES:  No edema.  No deformities.



LABORATORY DATA:  On 02/06/2019; white count 10.6, hemoglobin 13.2.  Sodium 143,

potassium 4.3, chloride 101, carbon dioxide 19, BUN 60, creatinine 6.18, glucose

117, and calcium 9.  AST 31, ALT 10, and albumin 4.0. 



IMAGING STUDIES:  Chest x-ray, mild CHF. 



CT scan of the brain, negative.



ASSESSMENT AND PLAN:  

1. End-stage renal disease, stable.  We will continue current three times a week

hemodialysis.  My plan is to schedule for dialysis tomorrow with fluid removal only

as tolerated. 

2. Near syncopal episode.  Unclear etiologies.  This could be simply from volume

depletion.  The patient has been having decreased p.o. and fluid intake for the last

several days. 

3. Anemia.  No indication for any Epogen.  Continue Epogen regimen as needed.

4. Overall agree with current management.







Job ID:  [id number]

## 2019-02-07 NOTE — PDOC.PN
- Subjective


Encounter Start Date: 02/07/19


Encounter Start Time: 12:10


Subjective: Patient doing well. More interactive today. Knows where he is. Still


-: doesn't know the year. No chest pain. No HA. No SOB.





- Objective


MAR Reviewed: Yes


Vital Signs & Weight: 


 Vital Signs (12 hours)











  Temp


 


 02/07/19 07:25  97.7 F


 


 02/07/19 03:49  98.1 F


 


 02/07/19 00:17  98.6 F








 Weight











Weight                         169 lb 3 oz











 Most Recent Monitor Data











Heart Rate from ECG            69


 


NIBP                           115/57


 


NIBP BP-Mean                   76


 


Respiration from ECG           19














I&O: 


 











 02/06/19 02/07/19 02/08/19





 06:59 06:59 06:59


 


Intake Total  500 


 


Balance  500 











Result Diagrams: 


 02/07/19 08:45





 02/07/19 08:45


Additional Labs: 


 Accuchecks











  02/07/19 02/06/19 02/06/19





  05:43 22:08 19:15


 


POC Glucose  137 H  96  101














Phys Exam





- Physical Examination


Constitutional: NAD


HEENT: moist MMs


Respiratory: no wheezing, no rales, no rhonchi


Cardiovascular: RRR


Gastrointestinal: soft, positive bowel sounds


Neurological: non-focal, moves all 4 limbs


Psychiatric: normal affect


Deviation from normal: Alert and oriented x 2





Dx/Plan


(1) Hypertensive emergency


Code(s): I16.1 - HYPERTENSIVE EMERGENCY   Status: Resolved   Comment: Likely 

due to medical non-compliance and missing dialysis, now that meds restarted BP 

down to normal.   





(2) Hypokalemia


Code(s): E87.6 - HYPOKALEMIA   Status: Acute   Comment: will need replacement 

during dialysis   





(3) ESRD (end stage renal disease) on dialysis


Code(s): N18.6 - END STAGE RENAL DISEASE; Z99.2 - DEPENDENCE ON RENAL DIALYSIS 

  Status: Chronic   Comment: dialysis per nephrology   





(4) Acute metabolic encephalopathy


Code(s): G93.41 - METABOLIC ENCEPHALOPATHY   Status: Acute   Comment: likely 

due to uremia, I do wonder if there is some underlying depression and agree 

with addition of SSRI   





(5) DM type 2 (diabetes mellitus, type 2)


Status: Chronic   Comment: holding insulin for now, bs well controlled   





(6) Hypertension


Code(s): I10 - ESSENTIAL (PRIMARY) HYPERTENSION   Status: Chronic   


Qualifiers: 


 


Comment: Difficult to control without causing hypotension, will resume meds 

that he was discharged on last visit   





(7) Gout


Code(s): M10.9 - GOUT, UNSPECIFIED   Status: Chronic   


Qualifiers: 


 


Comment: stable   





(8) Hyperlipidemia


Code(s): E78.5 - HYPERLIPIDEMIA, UNSPECIFIED   Status: Chronic   





- Plan


cont current plan of care, PT/OT, , DVT proph w/SCDs


Can transfer out of [facility]





* .








- Discharge Day


Encounter end time: 12:20

## 2019-02-07 NOTE — PRG
DATE OF SERVICE:  02/07/2019



SUBJECTIVE:  Mr. Scott is a 58-year-old black male with ESRD, was found 
unresponsive

at home. 



I had a close conversation with this patient.  The patient is essentially 
unable to

take care of himself at home.  We have scheduled him for his dialysis today.  
He did

miss dialysis yesterday and for that reason, we will do a short 3-hour 
hemodialysis

today with fluid removal only as tolerated. 



No other complaints.  No chest pain or shortness of breath.  He does have 
decreased

appetite. 



OBJECTIVE:  VITAL SIGNS:  Blood pressure 115/57, heart rate 69, respiratory 
rate 19. 

GENERAL EXAM:  He is noted to be awake, alert, comfortable, not in distress. 

SKIN:  Adequate turgor. 

HEENT:  He has slightly pale conjunctivae.  Anicteric sclerae. 

NECK:  No neck mass.  No carotid bruits.  No JVD. 

CHEST:  No deformities. 

LUNGS:  Decreased breath sounds. 

HEART:  Normal sinus rhythm.  No murmur.  No gallops.  No rubs. 

ABDOMEN:  Globular, soft, nontender.  No masses. 

EXTREMITIES:  No edema.  No deformities.



MEDICATIONS:  Medications of February 7, 2019, was reviewed.



LABORATORY DATA:  Laboratories of February 6, 2019, white count 10.4, hemoglobin

13.2.  Sodium 143, potassium 4.3, chloride 101, carbon dioxide 19, BUN 60,

creatinine 6.18, glucose 117, AST 31, ALT 10, albumin 4.0, glucose 137. 



ASSESSMENT AND PLAN:  

1. New syncopal episode.  Continue to monitor.  Supportive care.  Adjust blood

pressure medications as needed. 

2. End-stage renal disease, stable.  We will continue current three times a 
week 

hemodialysis.  Since he missed his dialysis yesterday, we will do a 3-hour

hemodialysis with this patient today. 

3. ? of depression.  Start Zoloft 100 mg one tablet daily.  Consider nursing 
home

placement for this patient, if he is amenable to this.







Job ID:  [id number]



[facility]

## 2019-02-08 NOTE — PDOC.PN
- Subjective


Encounter Start Date: 02/08/19


Encounter Start Time: 09:45


Subjective: is getting HD, no sob feels better





- Objective


MAR Reviewed: Yes


Vital Signs & Weight: 


 Vital Signs (12 hours)











  Temp Pulse Resp BP Pulse Ox


 


 02/08/19 04:00  97.7 F  63  16  139/68  91 L


 


 02/08/19 00:00  97.8 F  65  16  132/69  98








 Weight











Weight                         169 lb 3 oz











 Most Recent Monitor Data











Heart Rate from ECG            57


 


NIBP                           122/57


 


NIBP BP-Mean                   78


 


Respiration from ECG           16














I&O: 


 











 02/07/19 02/08/19 02/09/19





 06:59 06:59 06:59


 


Intake Total 500 2440 


 


Balance 500 2440 











Result Diagrams: 


 02/07/19 08:45





 02/07/19 08:45


Additional Labs: 


 Accuchecks











  02/08/19 02/07/19 02/07/19





  05:20 21:24 15:57


 


POC Glucose  98  143 H  121 H














Phys Exam





- Physical Examination


HEENT: PERRLA, moist MMs


Neck: no JVD, supple


Respiratory: no wheezing, no rales


Cardiovascular: RRR, no significant murmur


Gastrointestinal: soft, non-tender, positive bowel sounds


Musculoskeletal: no edema, pulses present


Neurological: non-focal, moves all 4 limbs


Psychiatric: normal affect, A&O x 3





Dx/Plan


(1) Acute metabolic encephalopathy


Code(s): G93.41 - METABOLIC ENCEPHALOPATHY   Status: Acute   Comment: resolving

   





(2) Hypokalemia


Code(s): E87.6 - HYPOKALEMIA   Status: Acute   Comment: will need replacement 

during dialysis   





(3) Hyperlipidemia


Code(s): E78.5 - HYPERLIPIDEMIA, UNSPECIFIED   Status: Chronic   


Qualifiers: 


   Hyperlipidemia type: unspecified   Qualified Code(s): E78.5 - Hyperlipidemia

, unspecified   





(4) Hypertensive emergency


Code(s): I16.1 - HYPERTENSIVE EMERGENCY   Status: Resolved   Comment: Likely 

due to medical non-compliance and missing dialysis, now that meds restarted BP 

down to normal.   





(5) Anemia of renal disease


Code(s): D63.1 - ANEMIA IN CHRONIC KIDNEY DISEASE   Status: Chronic   





(6) Chronic hepatitis C


Code(s): B18.2 - CHRONIC VIRAL HEPATITIS C   Status: Chronic   


Qualifiers: 


 


Comment: stable   





(7) DM type 2 (diabetes mellitus, type 2)


Status: Chronic   


Qualifiers: 


   Diabetes mellitus long term insulin use: with long term use   Diabetes 

mellitus complication status: with kidney complications   Chronic kidney 

disease stage: on chronic dialysis 


Comment: holding insulin for now, bs well controlled   





(8) ESRD (end stage renal disease) on dialysis


Code(s): N18.6 - END STAGE RENAL DISEASE; Z99.2 - DEPENDENCE ON RENAL DIALYSIS 

  Status: Chronic   Comment: dialysis per nephrology   





(9) Gout


Code(s): M10.9 - GOUT, UNSPECIFIED   Status: Chronic   


Qualifiers: 


 


Comment: stable   





(10) Hypertension


Code(s): I10 - ESSENTIAL (PRIMARY) HYPERTENSION   Status: Chronic   


Qualifiers: 


 


Comment: will resume meds that he was discharged on last visit   





(11) Secondary hyperparathyroidism of renal origin


Code(s): N25.81 - SECONDARY HYPERPARATHYROIDISM OF RENAL ORIGIN   Status: 

Chronic   





- Plan


is getting HD


-: cognitively responds well, a bit lethargic


-: is on asp, lipitor, coreg, hydralazine, imdur


-: on zoloft for suspected depression


-: to amb with PT as tolerated, dc planning





* .








Review of Systems





- Medications/Allergies


Allergies/Adverse Reactions: 


 Allergies











Allergy/AdvReac Type Severity Reaction Status Date / Time


 


No Known Drug Allergies Allergy   Verified 02/07/19 00:42











Medications: 


 Current Medications





Acetaminophen (Tylenol)  650 mg PO Q4H PRN


   PRN Reason: Headache/Fever/Mild Pain (1-3)


   Last Admin: 02/07/19 13:12 Dose:  650 mg


Acetaminophen (Tylenol)  650 mg NE Q4H PRN


   PRN Reason: Headache/Fever/Mild Pain (1-3)


Allopurinol (Zyloprim)  150 mg PO DAILY [facility]


   Last Admin: 02/07/19 09:43 Dose:  Not Given


Aspirin (Ecotrin)  81 mg PO DAILY [facility]


   Last Admin: 02/07/19 08:30 Dose:  81 mg


Atorvastatin Calcium (Lipitor)  10 mg PO HS [facility]


   Last Admin: 02/07/19 20:05 Dose:  10 mg


Carvedilol (Coreg)  25 mg PO BID [facility]


   Last Admin: 02/07/19 20:05 Dose:  25 mg


Dextrose/Water (Dextrose 50%)  25 gm SLOW IVP PRN PRN


   PRN Reason: Hypoglycemia


Ferrous Sulfate (Feosol)  325 mg PO Q[facility]


   Last Admin: 02/07/19 08:30 Dose:  325 mg


Glucagon (Glucagon)  1 mg IM PRN PRN


   PRN Reason: Hypoglycemia


Hydralazine HCl (Apresoline)  10 mg SLOW IVP Q4H PRN


   PRN Reason: Severe Hypertension


Hydralazine HCl (Apresoline)  25 mg PO BID [facility]


   Last Admin: 02/07/19 20:05 Dose:  25 mg


Dextrose/Water (D5w)  1,000 mls @ 0 mls/hr IV .Q0M PRN


   PRN Reason: Hypoglycemia


Insulin Human Lispro (Humalog)  0 units SC .MILD SLIDING SCALE PRN


   PRN Reason: Mild Correctional Scale


Insulin Human Lispro (Humalog)  0 units SC .BEDTIME SLIDING SC PRN


   PRN Reason: Bedtime Correctional Scale


Isosorbide Mononitrate (Imdur)  60 mg PO DAILY [facility]


   Last Admin: 02/07/19 08:30 Dose:  60 mg


Labetalol HCl (Normodyne)  10 mg SLOW IVP Q4H PRN


   PRN Reason: SBP Greater Than 180


Ondansetron HCl (Zofran Odt)  4 mg PO Q6H PRN


   PRN Reason: Nausea/Vomiting


Ondansetron HCl (Zofran)  4 mg IVP Q6H PRN


   PRN Reason: Nausea/Vomiting


Pantoprazole Sodium (Protonix)  40 mg PO DAILY [facility]


   Last Admin: 02/07/19 08:30 Dose:  40 mg


Senna/Docusate Sodium (Senokot S)  2 tab PO BID PRN


   PRN Reason: Constipation


Sertraline HCl (Zoloft)  100 mg PO DAILY [facility]


   Last Admin: 02/07/19 13:13 Dose:  100 mg

## 2019-02-08 NOTE — PRG
DATE OF SERVICE:  02/08/2019



SUBJECTIVE:  Mr. Scott is a 58-year-old black male with ESRD and being followed by

the [facility] for his maintenance hemodialysis.  He was admitted due to near

syncopal episode.  I had a long discussion with this patient.  He would like to

consider skilled nursing facility.  He is unable to care for himself at home.  He is

currently undergoing dialysis.  Tolerating said treatment. 



No other complaints.  No chest pain or shortness of breath.  He is feeling a little

better. 



OBJECTIVE:  VITAL SIGNS:  Blood pressure 139/68, heart rate 63, respiratory rate 16,

temperature 97.7, and pulse ox 91%. 

GENERAL:  Noted to be awake, alert, comfortable, not in overt distress. 

SKIN:  Adequate turgor. 

HEENT:  He has pinkish conjunctivae.  Anicteric sclerae.  No neck mass.  No carotid

bruits.  No JVD. 

CHEST:  No deformities. 

LUNGS:  Clear breath sounds.  No wheezing.  No crackles. 

HEART:  Normal sinus rhythm.  No murmurs, gallops, or rubs. 

ABDOMEN:  Globular, soft, nontender.  No masses. 

EXTREMITIES:  No edema.  No deformities.



MEDICATIONS:  Medications of February 8, 2019 was reviewed.



LABORATORY DATA:  Laboratories of February 7, 2019; white count 7.7, hemoglobin

11.3.  Sodium 138, potassium 2.8, chloride 98, carbon dioxide 23, BUN 67, creatinine

6.65, calcium 8.2. 



ASSESSMENT AND PLAN:  

1. End-stage renal disease, stable.  Continue current Monday, Wednesday, Friday

dialysis regimen.  Fluid removal only as tolerated.  We will change dialysis time 

from 4 hours to 3.5 hours.

2. Hypokalemia - potassium bath.  He is currently at 4.0.  We will recheck basic 

metabolic panel in a.m.

3. Case management consult for skilled nursing facility placement.







Job ID:  [id number]

## 2019-02-09 NOTE — EKG
Test Reason : 

Blood Pressure : ***/*** mmHG

Vent. Rate : 085 BPM     Atrial Rate : 085 BPM

   P-R Int : 194 ms          QRS Dur : 094 ms

    QT Int : 462 ms       P-R-T Axes : -08 [phone] degrees

   QTc Int : 549 ms

 

Normal sinus rhythm

RSR' or QR pattern in V1 suggests right ventricular conduction delay

Voltage criteria for left ventricular hypertrophy

Nonspecific T wave abnormality

Prolonged QT

Abnormal ECG

 

Confirmed by HALI GOFF ([id number]),  AYLA LUTHER ([id number]) on 2/9/2019 6:44:41 PM

 

Referred By:             Confirmed By:HALI GOFF

## 2019-02-09 NOTE — PDOC.PN
- Subjective


Encounter Start Date: 02/09/19


Encounter Start Time: 10:40


Subjective: is lethargic but oriented


-: ate his breakfast


-: no sob





- Objective


MAR Reviewed: Yes


Vital Signs & Weight: 


 Vital Signs (12 hours)











  Temp Pulse Resp BP BP Pulse Ox


 


 02/09/19 11:50  97.4 F L  70  20   172/72 H  92 L


 


 02/09/19 08:35   66   173/80 H  


 


 02/09/19 08:00  97.5 F L  62  16   173/80 H  91 L


 


 02/09/19 05:54      179/82 H 


 


 02/09/19 03:58  97.8 F  65  20   180/77 H  91 L








 Weight











Weight                         169 lb 3 oz











 Most Recent Monitor Data











Heart Rate from ECG            57


 


NIBP                           122/57


 


NIBP BP-Mean                   78


 


Respiration from ECG           16














I&O: 


 











 02/08/19 02/09/19 02/10/19





 06:59 06:59 06:59


 


Intake Total 2440 1090 


 


Output Total  2000 


 


Balance 2440 -910 











Result Diagrams: 


 02/09/19 04:05





 02/09/19 04:05


Additional Labs: 


 Accuchecks











  02/09/19 02/08/19 02/08/19





  05:51 21:03 15:39


 


POC Glucose  103  137 H  106














Phys Exam





- Physical Examination


HEENT: PERRLA, moist MMs


Neck: no JVD, supple


Respiratory: no wheezing, no rales


Cardiovascular: RRR, no significant murmur


Gastrointestinal: soft, non-tender, positive bowel sounds


Musculoskeletal: no edema, pulses present


Neurological: non-focal, moves all 4 limbs





Dx/Plan


(1) Acute metabolic encephalopathy


Code(s): G93.41 - METABOLIC ENCEPHALOPATHY   Status: Acute   Comment: resolving

   





(2) Hypokalemia


Code(s): E87.6 - HYPOKALEMIA   Status: Resolved   





(3) Hyperlipidemia


Code(s): E78.5 - HYPERLIPIDEMIA, UNSPECIFIED   Status: Chronic   


Qualifiers: 


   Hyperlipidemia type: unspecified   Qualified Code(s): E78.5 - Hyperlipidemia

, unspecified   





(4) Hypertensive emergency


Code(s): I16.1 - HYPERTENSIVE EMERGENCY   Status: Resolved   Comment: Likely 

due to medical non-compliance and missing dialysis, now that meds restarted BP 

down to normal.   





(5) Anemia of renal disease


Code(s): D63.1 - ANEMIA IN CHRONIC KIDNEY DISEASE   Status: Chronic   





(6) Chronic hepatitis C


Code(s): B18.2 - CHRONIC VIRAL HEPATITIS C   Status: Chronic   


Qualifiers: 


   Hepatic coma status: without hepatic coma 


Comment: stable   





(7) DM type 2 (diabetes mellitus, type 2)


Status: Chronic   


Qualifiers: 


   Diabetes mellitus long term insulin use: with long term use   Diabetes 

mellitus complication status: with kidney complications   Chronic kidney 

disease stage: on chronic dialysis 


Comment: holding insulin for now, bs well controlled   





(8) ESRD (end stage renal disease) on dialysis


Code(s): N18.6 - END STAGE RENAL DISEASE; Z99.2 - DEPENDENCE ON RENAL DIALYSIS 

  Status: Chronic   Comment: dialysis per nephrology   





(9) Gout


Code(s): M10.9 - GOUT, UNSPECIFIED   Status: Chronic   


Qualifiers: 


 


Comment: stable   





(10) Hypertension


Code(s): I10 - ESSENTIAL (PRIMARY) HYPERTENSION   Status: Chronic   


Qualifiers: 


 





(11) Secondary hyperparathyroidism of renal origin


Code(s): N25.81 - SECONDARY HYPERPARATHYROIDISM OF RENAL ORIGIN   Status: 

Chronic   





- Plan


to mobilize more with PT, a bit lethargic but better than yesterday


-: awaiting rehab placement


-: HD per nephrology adv


-: continue asp, lipitor, coreg, hydralazine, imdur and zoloft


-: has deconditioning and barely stood with PT yesterday





* .








Review of Systems





- Medications/Allergies


Allergies/Adverse Reactions: 


 Allergies











Allergy/AdvReac Type Severity Reaction Status Date / Time


 


No Known Drug Allergies Allergy   Verified 02/07/19 00:42











Medications: 


 Current Medications





Acetaminophen (Tylenol)  650 mg PO Q4H PRN


   PRN Reason: Headache/Fever/Mild Pain (1-3)


   Last Admin: 02/07/19 13:12 Dose:  650 mg


Acetaminophen (Tylenol)  650 mg CT Q4H PRN


   PRN Reason: Headache/Fever/Mild Pain (1-3)


Allopurinol (Zyloprim)  150 mg PO DAILY [facility]


   Last Admin: 02/09/19 08:34 Dose:  150 mg


Aspirin (Ecotrin)  81 mg PO DAILY [facility]


   Last Admin: 02/09/19 08:34 Dose:  81 mg


Atorvastatin Calcium (Lipitor)  10 mg PO HS [facility]


   Last Admin: 02/08/19 20:45 Dose:  10 mg


Carvedilol (Coreg)  25 mg PO BID [facility]


   Last Admin: 02/09/19 08:34 Dose:  25 mg


Dextrose/Water (Dextrose 50%)  25 gm SLOW IVP PRN PRN


   PRN Reason: Hypoglycemia


Ferrous Sulfate (Feosol)  325 mg PO QA-[facility]


   Last Admin: 02/09/19 08:34 Dose:  325 mg


Glucagon (Glucagon)  1 mg IM PRN PRN


   PRN Reason: Hypoglycemia


Hydralazine HCl (Apresoline)  10 mg SLOW IVP Q4H PRN


   PRN Reason: Severe Hypertension


Hydralazine HCl (Apresoline)  25 mg PO BID [facility]


   Last Admin: 02/09/19 08:35 Dose:  25 mg


Dextrose/Water (D5w)  1,000 mls @ 0 mls/hr IV .Q0M PRN


   PRN Reason: Hypoglycemia


Insulin Human Lispro (Humalog)  0 units SC .MILD SLIDING SCALE PRN


   PRN Reason: Mild Correctional Scale


Insulin Human Lispro (Humalog)  0 units SC .BEDTIME SLIDING SC PRN


   PRN Reason: Bedtime Correctional Scale


Isosorbide Mononitrate (Imdur)  60 mg PO DAILY [facility]


   Last Admin: 02/09/19 08:35 Dose:  60 mg


Labetalol HCl (Normodyne)  10 mg SLOW IVP Q4H PRN


   PRN Reason: SBP Greater Than 180


Ondansetron HCl (Zofran Odt)  4 mg PO Q6H PRN


   PRN Reason: Nausea/Vomiting


   Last Admin: 02/09/19 08:34 Dose:  4 mg


Ondansetron HCl (Zofran)  4 mg IVP Q6H PRN


   PRN Reason: Nausea/Vomiting


Pantoprazole Sodium (Protonix)  40 mg PO DAILY [facility]


   Last Admin: 02/09/19 08:34 Dose:  40 mg


Senna/Docusate Sodium (Senokot S)  2 tab PO BID PRN


   PRN Reason: Constipation


Sertraline HCl (Zoloft)  100 mg PO DAILY [facility]


   Last Admin: 02/09/19 08:34 Dose:  100 mg

## 2019-02-10 NOTE — PDOC.PN
- Subjective


Encounter Start Date: 02/10/19


Encounter Start Time: 08:15


Subjective: is more awake and oriented this am


-: no chest pain or sob, has elevated sbp


-: is moving all extre on bed





- Objective


MAR Reviewed: Yes


Vital Signs & Weight: 


 Vital Signs (12 hours)











  Temp Pulse Resp BP BP Pulse Ox


 


 02/10/19 10:26   63   184/87 H  


 


 02/10/19 10:04   63   200/92 H  


 


 02/10/19 08:12   63   200/92 H  


 


 02/10/19 08:06      200/92 H 


 


 02/10/19 08:00  97.3 F L  63  18   193/85 H  94 L


 


 02/10/19 07:50       94 L


 


 02/10/19 04:12      179/80 H 


 


 02/10/19 04:00  97.7 F  59 L  16   190/84 H  92 L


 


 02/10/19 03:49   59 L   188/85 H  


 


 02/10/19 01:30       94 L


 


 02/10/19 00:27  97.7 F  63  16   182/81 H  90 L








 Weight











Weight                         169 lb 3 oz











 Most Recent Monitor Data











Heart Rate from ECG            57


 


NIBP                           122/57


 


NIBP BP-Mean                   78


 


Respiration from ECG           16














I&O: 


 











 02/09/19 02/10/19 02/11/19





 06:59 06:59 06:59


 


Intake Total 1090 730 


 


Output Total 2000  


 


Balance -910 730 











Result Diagrams: 


 02/09/19 04:05





 02/09/19 04:05


Additional Labs: 


 Accuchecks











  02/10/19 02/09/19 02/09/19





  05:45 19:30 15:59


 


POC Glucose  84  166 H  110














  02/09/19





  10:45


 


POC Glucose  118 H














Phys Exam





- Physical Examination


HEENT: PERRLA, moist MMs


Neck: no JVD, supple


Respiratory: no wheezing, no rales


Cardiovascular: RRR, no significant murmur


Gastrointestinal: soft, non-tender, positive bowel sounds


Musculoskeletal: no edema, pulses present


Neurological: non-focal, moves all 4 limbs


Psychiatric: normal affect, A&O x 3





Dx/Plan


(1) Acute metabolic encephalopathy


Code(s): G93.41 - METABOLIC ENCEPHALOPATHY   Status: Acute   Comment: resolving

   





(2) Hypokalemia


Code(s): E87.6 - HYPOKALEMIA   Status: Resolved   





(3) Hyperlipidemia


Code(s): E78.5 - HYPERLIPIDEMIA, UNSPECIFIED   Status: Chronic   


Qualifiers: 


   Hyperlipidemia type: unspecified   Qualified Code(s): E78.5 - Hyperlipidemia

, unspecified   





(4) Hypertensive emergency


Code(s): I16.1 - HYPERTENSIVE EMERGENCY   Status: Resolved   Comment: Likely 

due to medical non-compliance and missing dialysis, now that meds restarted BP 

down to normal.   





(5) Anemia of renal disease


Code(s): D63.1 - ANEMIA IN CHRONIC KIDNEY DISEASE   Status: Chronic   





(6) Chronic hepatitis C


Code(s): B18.2 - CHRONIC VIRAL HEPATITIS C   Status: Chronic   


Qualifiers: 


   Hepatic coma status: without hepatic coma 


Comment: stable   





(7) DM type 2 (diabetes mellitus, type 2)


Status: Chronic   


Qualifiers: 


   Diabetes mellitus long term insulin use: with long term use   Diabetes 

mellitus complication status: with kidney complications   Chronic kidney 

disease stage: on chronic dialysis 


Comment: holding insulin for now, bs well controlled   





(8) ESRD (end stage renal disease) on dialysis


Code(s): N18.6 - END STAGE RENAL DISEASE; Z99.2 - DEPENDENCE ON RENAL DIALYSIS 

  Status: Chronic   Comment: dialysis per nephrology   





(9) Gout


Code(s): M10.9 - GOUT, UNSPECIFIED   Status: Chronic   


Qualifiers: 


 


Comment: stable   





(10) Hypertension


Code(s): I10 - ESSENTIAL (PRIMARY) HYPERTENSION   Status: Chronic   


Qualifiers: 


 





(11) Secondary hyperparathyroidism of renal origin


Code(s): N25.81 - SECONDARY HYPERPARATHYROIDISM OF RENAL ORIGIN   Status: 

Chronic   





(12) Physical deconditioning


Code(s): R53.81 - OTHER MALAISE   Status: Acute   





- Plan


is awaiting placement to rehab


-: encephalopathy is almost resolved


-: to mobilize more with PT, barely stood on the 8th


-: increase hydralazine to 75mg tid, add procardia xl


-: continue coreg, imdur, asp, lipitor and zoloft





* .








Review of Systems





- Medications/Allergies


Allergies/Adverse Reactions: 


 Allergies











Allergy/AdvReac Type Severity Reaction Status Date / Time


 


No Known Drug Allergies Allergy   Verified 02/07/19 00:42











Medications: 


 Current Medications





Acetaminophen (Tylenol)  650 mg PO Q4H PRN


   PRN Reason: Headache/Fever/Mild Pain (1-3)


   Last Admin: 02/07/19 13:12 Dose:  650 mg


Acetaminophen (Tylenol)  650 mg MO Q4H PRN


   PRN Reason: Headache/Fever/Mild Pain (1-3)


Allopurinol (Zyloprim)  150 mg PO DAILY [facility]


   Last Admin: 02/10/19 08:11 Dose:  150 mg


Aspirin (Ecotrin)  81 mg PO DAILY [facility]


   Last Admin: 02/10/19 08:13 Dose:  81 mg


Atorvastatin Calcium (Lipitor)  10 mg PO HS [facility]


   Last Admin: 02/09/19 20:18 Dose:  10 mg


Carvedilol (Coreg)  25 mg PO BID [facility]


   Last Admin: 02/10/19 08:13 Dose:  25 mg


Dextrose/Water (Dextrose 50%)  25 gm SLOW IVP PRN PRN


   PRN Reason: Hypoglycemia


Ferrous Sulfate (Feosol)  325 mg PO QA-[facility]


   Last Admin: 02/10/19 08:11 Dose:  325 mg


Fish Oil (Fish Oil)  1,000 mg PO BID [facility]


   Last Admin: 02/10/19 10:26 Dose:  1,000 mg


Glucagon (Glucagon)  1 mg IM PRN PRN


   PRN Reason: Hypoglycemia


Hydralazine HCl (Apresoline)  10 mg SLOW IVP Q4H PRN


   PRN Reason: Severe Hypertension


   Last Admin: 02/10/19 03:49 Dose:  10 mg


Hydralazine HCl (Apresoline)  75 mg PO TID [facility]


   Last Admin: 02/10/19 10:04 Dose:  Not Given


Dextrose/Water (D5w)  1,000 mls @ 0 mls/hr IV .Q0M PRN


   PRN Reason: Hypoglycemia


Insulin Human Lispro (Humalog)  0 units SC .MILD SLIDING SCALE PRN


   PRN Reason: Mild Correctional Scale


Insulin Human Lispro (Humalog)  0 units SC .BEDTIME SLIDING SC PRN


   PRN Reason: Bedtime Correctional Scale


Isosorbide Mononitrate (Imdur)  60 mg PO DAILY [facility]


   Last Admin: 02/10/19 08:12 Dose:  60 mg


Labetalol HCl (Normodyne)  10 mg SLOW IVP Q4H PRN


   PRN Reason: SBP Greater Than 180


Nifedipine (Procardia Xl)  30 mg PO DAILY [facility]


   Last Admin: 02/10/19 10:26 Dose:  30 mg


Ondansetron HCl (Zofran Odt)  4 mg PO Q6H PRN


   PRN Reason: Nausea/Vomiting


   Last Admin: 02/09/19 08:34 Dose:  4 mg


Ondansetron HCl (Zofran)  4 mg IVP Q6H PRN


   PRN Reason: Nausea/Vomiting


Pantoprazole Sodium (Protonix)  40 mg PO DAILY [facility]


   Last Admin: 02/10/19 08:12 Dose:  40 mg


Polyethylene Glycol (Miralax)  17 gm PO DAILY [facility]


   Last Admin: 02/10/19 10:25 Dose:  17 gm


Senna/Docusate Sodium (Senokot S)  2 tab PO BID PRN


   PRN Reason: Constipation


Sertraline HCl (Zoloft)  100 mg PO DAILY [facility]


   Last Admin: 02/10/19 08:11 Dose:  100 mg

## 2019-02-10 NOTE — PRG
DATE OF SERVICE:  02/10/2019



SUBJECTIVE:  Mr. Scott is a 58-year-old black male with ESRD and followed by the

[facility]al [facility] for his maintenance hemodialysis.  He received dialysis on Friday.  He

is doing well.  We are currently awaiting nursing home placement versus rehab

placement for this patient.  No other complaints today.  He still feels weak.  His

appetite is somewhat improved.  He denies any chest pain or shortness of breath. 



OBJECTIVE:  VITAL SIGNS:  Blood pressure is 184/87, with heart rate of 63,

respiratory rate 18, temperature 97.3, and pulse ox 94%. 

GENERAL:  Noted to be awake, alert, comfortable, not in distress. 

SKIN:  Adequate turgor. 

HEENT:  Slightly pale conjunctivae.  Anicteric sclerae.  No neck mass.  No carotid

bruits.  No JVD. 

CHEST:  No deformities. 

LUNGS:  Clear breath sounds. 

HEART:  Normal sinus rhythm.  No murmur.  No gallops.  No rubs. 

ABDOMEN:  Globular, soft, nontender.  No masses. 

EXTREMITIES:  No edema.  No deformities.



MEDICATIONS:  Medications of 02/10/2019, reviewed.



LABORATORY DATA:  Of 02/09/2019; hemoglobin 10.9.  Sodium 138, potassium 3.5,

chloride 100, carbon dioxide 27, BUN 18, creatinine 3.06, calcium 8. 



ASSESSMENT AND PLAN:  

1. End-stage renal disease.  Continue current Monday, Wednesday, Friday dialysis.

Again, fluid removal only as tolerated. 

2. Borderline anemia-no indication for Epogen.  Continue iron supplementation.

3. General deconditioning.  Unable to take care of himself at home.  Placement in a

skilled nursing facility versus rehab is being considered.  We are awaiting final

decision. 

Overall, agree with current management.





Job ID:  [id number]

## 2019-02-11 NOTE — PRG
DATE OF SERVICE:  02/11/2019



SUBJECTIVE:  Mr. Scott is a 58-year-old black male, who was admitted for generalized

malaise and weakness.  He was also found to be unresponsive at home.  He is doing

well.  We are awaiting for a nursing home placement with this patient.  He is

currently undergoing hemodialysis.  I am at the bedside supervising his dialysis.

He voices no new complaints.  No chest pain or shortness of breath. 



OBJECTIVE:  VITAL SIGNS:  Blood pressure 155/74, heart rate 63, respiratory rate 20,

temperature 97.8, and pulse ox 92%. 

GENERAL:  Noted to be awake, alert, comfortable, not in distress. 

SKIN:  Adequate turgor. 

HEENT:  He has pinkish conjunctivae.  Anicteric sclerae.  No neck mass.  No carotid

bruits.  No JVD. 

CHEST:  No deformities. 

LUNGS:  Clear breath sounds. 

HEART:  Normal sinus rhythm.  No murmurs, gallops, or rubs. 

ABDOMEN:  Globular, soft, nontender.  No masses. 

EXTREMITIES:  No edema.  No deformities.



MEDICATIONS:  Medications of February 11, 2019, reviewed.



LABORATORY DATA:  Laboratories of February 9, 2019; white count 4.4, hemoglobin

10.9.  On February 10, 2019, glucose 157. 



ASSESSMENT AND PLAN:  

1. End-stage renal disease, stable, tolerating current hemodialysis regimen.  Fluid

removal as tolerated.  Continue Monday, Wednesday, Friday dialysis. 

2. Awaiting nursing home placement.







Job ID:  [id number]

## 2019-02-11 NOTE — PDOC.PN
- Subjective


Encounter Start Date: 02/11/19


Encounter Start Time: 08:50


Subjective: getting HD


-: no sob, is awake and responds well





- Objective


MAR Reviewed: Yes


Vital Signs & Weight: 


 Vital Signs (12 hours)











  Temp Pulse Resp BP Pulse Ox


 


 02/11/19 07:50      92 L


 


 02/11/19 07:30  97.8 F  63  20  155/74 H  92 L


 


 02/11/19 04:00  97.5 F L  63  20  143/66 H  93 L


 


 02/11/19 00:00  97.7 F  60  20  126/67  92 L








 Weight











Weight                         169 lb 3 oz











 Most Recent Monitor Data











Heart Rate from ECG            57


 


NIBP                           122/57


 


NIBP BP-Mean                   78


 


Respiration from ECG           16














I&O: 


 











 02/10/19 02/11/19 02/12/19





 06:59 06:59 06:59


 


Intake Total 730 900 


 


Balance 730 900 











Result Diagrams: 


 02/09/19 04:05





 02/09/19 04:05


Additional Labs: 


 Accuchecks











  02/10/19 02/10/19 02/10/19





  20:45 16:10 11:17


 


POC Glucose  157 H  109  115 H














Phys Exam





- Physical Examination


HEENT: PERRLA, moist MMs


Neck: no JVD, supple


Respiratory: no wheezing, no rales


Cardiovascular: RRR, no significant murmur


Gastrointestinal: soft, non-tender, positive bowel sounds


Musculoskeletal: no edema, pulses present


Neurological: non-focal, moves all 4 limbs


Psychiatric: A&O x 3





Dx/Plan


(1) Acute metabolic encephalopathy


Code(s): G93.41 - METABOLIC ENCEPHALOPATHY   Status: Resolved   





(2) Hypokalemia


Code(s): E87.6 - HYPOKALEMIA   Status: Resolved   





(3) Hyperlipidemia


Code(s): E78.5 - HYPERLIPIDEMIA, UNSPECIFIED   Status: Chronic   


Qualifiers: 


   Hyperlipidemia type: unspecified   Qualified Code(s): E78.5 - Hyperlipidemia

, unspecified   





(4) Hypertensive emergency


Code(s): I16.1 - HYPERTENSIVE EMERGENCY   Status: Resolved   Comment: Likely 

due to medical non-compliance and missing dialysis, now that meds restarted BP 

down to normal.   





(5) Anemia of renal disease


Code(s): D63.1 - ANEMIA IN CHRONIC KIDNEY DISEASE   Status: Chronic   





(6) Chronic hepatitis C


Code(s): B18.2 - CHRONIC VIRAL HEPATITIS C   Status: Chronic   


Qualifiers: 


   Hepatic coma status: without hepatic coma 


Comment: stable   





(7) DM type 2 (diabetes mellitus, type 2)


Status: Chronic   


Qualifiers: 


   Diabetes mellitus long term insulin use: with long term use   Diabetes 

mellitus complication status: with kidney complications   Chronic kidney 

disease stage: on chronic dialysis 


Comment: holding insulin for now, bs well controlled   





(8) ESRD (end stage renal disease) on dialysis


Code(s): N18.6 - END STAGE RENAL DISEASE; Z99.2 - DEPENDENCE ON RENAL DIALYSIS 

  Status: Chronic   Comment: dialysis per nephrology   





(9) Gout


Code(s): M10.9 - GOUT, UNSPECIFIED   Status: Chronic   


Qualifiers: 


 


Comment: stable   





(10) Hypertension


Code(s): I10 - ESSENTIAL (PRIMARY) HYPERTENSION   Status: Chronic   


Qualifiers: 


 





(11) Secondary hyperparathyroidism of renal origin


Code(s): N25.81 - SECONDARY HYPERPARATHYROIDISM OF RENAL ORIGIN   Status: 

Chronic   





(12) Physical deconditioning


Code(s): R53.81 - OTHER MALAISE   Status: Acute   





- Plan


awaiting placement, may dc if ready


-: continue asp, lipitor, coreg, imdur, hydralazine and zoloft


-: to mobilize as tolerated with PT


-: hemostable





* .








Review of Systems





- Medications/Allergies


Allergies/Adverse Reactions: 


 Allergies











Allergy/AdvReac Type Severity Reaction Status Date / Time


 


No Known Drug Allergies Allergy   Verified 02/07/19 00:42











Medications: 


 Current Medications





Acetaminophen (Tylenol)  650 mg PO Q4H PRN


   PRN Reason: Headache/Fever/Mild Pain (1-3)


   Last Admin: 02/07/19 13:12 Dose:  650 mg


Acetaminophen (Tylenol)  650 mg LA Q4H PRN


   PRN Reason: Headache/Fever/Mild Pain (1-3)


Allopurinol (Zyloprim)  150 mg PO DAILY [facility]


   Last Admin: 02/10/19 08:11 Dose:  150 mg


Aspirin (Ecotrin)  81 mg PO DAILY [facility]


   Last Admin: 02/10/19 08:13 Dose:  81 mg


Atorvastatin Calcium (Lipitor)  10 mg PO HS [facility]


   Last Admin: 02/10/19 20:42 Dose:  10 mg


Carvedilol (Coreg)  25 mg PO BID [facility]


   Last Admin: 02/10/19 20:44 Dose:  Not Given


Dextrose/Water (Dextrose 50%)  25 gm SLOW IVP PRN PRN


   PRN Reason: Hypoglycemia


Epoetin Fausto (Procrit)  7,500 units SC Q7D [facility]


Ferrous Sulfate (Feosol)  325 mg PO QAM-[facility]


   Last Admin: 02/10/19 08:11 Dose:  325 mg


Fish Oil (Fish Oil)  1,000 mg PO BID [facility]


   Last Admin: 02/10/19 20:42 Dose:  1,000 mg


Glucagon (Glucagon)  1 mg IM PRN PRN


   PRN Reason: Hypoglycemia


Hydralazine HCl (Apresoline)  10 mg SLOW IVP Q4H PRN


   PRN Reason: Severe Hypertension


   Last Admin: 02/10/19 12:47 Dose:  10 mg


Hydralazine HCl (Apresoline)  75 mg PO TID [facility]


   Last Admin: 02/10/19 20:41 Dose:  75 mg


Dextrose/Water (D5w)  1,000 mls @ 0 mls/hr IV .Q0M PRN


   PRN Reason: Hypoglycemia


Insulin Human Lispro (Humalog)  0 units SC .MILD SLIDING SCALE PRN


   PRN Reason: Mild Correctional Scale


Insulin Human Lispro (Humalog)  0 units SC .BEDTIME SLIDING SC PRN


   PRN Reason: Bedtime Correctional Scale


Isosorbide Mononitrate (Imdur)  60 mg PO DAILY [facility]


   Last Admin: 02/10/19 08:12 Dose:  60 mg


Labetalol HCl (Normodyne)  10 mg SLOW IVP Q4H PRN


   PRN Reason: SBP Greater Than 180


Nifedipine (Procardia Xl)  30 mg PO DAILY [facility]


   Last Admin: 02/10/19 10:26 Dose:  30 mg


Ondansetron HCl (Zofran Odt)  4 mg PO Q6H PRN


   PRN Reason: Nausea/Vomiting


   Last Admin: 02/09/19 08:34 Dose:  4 mg


Ondansetron HCl (Zofran)  4 mg IVP Q6H PRN


   PRN Reason: Nausea/Vomiting


Pantoprazole Sodium (Protonix)  40 mg PO DAILY [facility]


   Last Admin: 02/10/19 08:12 Dose:  40 mg


Polyethylene Glycol (Miralax)  17 gm PO DAILY [facility]


   Last Admin: 02/10/19 10:25 Dose:  17 gm


Senna/Docusate Sodium (Senokot S)  2 tab PO BID PRN


   PRN Reason: Constipation


Sertraline HCl (Zoloft)  100 mg PO DAILY [facility]


   Last Admin: 02/10/19 08:11 Dose:  100 mg

## 2019-02-12 NOTE — PDOC.PN
- Subjective


Encounter Start Date: 02/12/19


Encounter Start Time: 07:35


Subjective: awake, wants to sleep


-: does not feel like eating breakfast this am


-: no sob or chest pain, moves all extremities





- Objective


MAR Reviewed: Yes


Vital Signs & Weight: 


 Vital Signs (12 hours)











  Temp Pulse Resp BP BP Pulse Ox


 


 02/12/19 10:07   60    


 


 02/12/19 10:06  97.8 F  64  16  156/69 H  156/69 H  93 L


 


 02/12/19 04:00  97.4 F L  60  20   136/67  95


 


 02/12/19 03:43       91 L








 Weight











Weight                         169 lb 3 oz











 Most Recent Monitor Data











Heart Rate from ECG            57


 


NIBP                           122/57


 


NIBP BP-Mean                   78


 


Respiration from ECG           16














I&O: 


 











 02/11/19 02/12/19 02/13/19





 06:59 06:59 06:59


 


Intake Total 900 720 


 


Balance 900 720 











Result Diagrams: 


 02/11/19 14:43





 02/09/19 04:05


Additional Labs: 


 Accuchecks











  02/12/19 02/11/19 02/11/19





  06:12 20:33 16:08


 


POC Glucose  127 H  135 H  120 H














  02/11/19 02/11/19





  13:44 05:16


 


POC Glucose  86  101














Phys Exam





- Physical Examination


HEENT: PERRLA, moist MMs


Neck: no JVD, supple


Respiratory: no wheezing, no rales


Cardiovascular: RRR, no significant murmur


Gastrointestinal: soft, non-tender, positive bowel sounds


Musculoskeletal: no edema, pulses present


Neurological: non-focal, moves all 4 limbs





Dx/Plan


(1) Acute metabolic encephalopathy


Code(s): G93.41 - METABOLIC ENCEPHALOPATHY   Status: Resolved   





(2) Hypokalemia


Code(s): E87.6 - HYPOKALEMIA   Status: Resolved   





(3) Hyperlipidemia


Code(s): E78.5 - HYPERLIPIDEMIA, UNSPECIFIED   Status: Chronic   


Qualifiers: 


   Hyperlipidemia type: unspecified   Qualified Code(s): E78.5 - Hyperlipidemia

, unspecified   





(4) Hypertensive emergency


Code(s): I16.1 - HYPERTENSIVE EMERGENCY   Status: Resolved   Comment: Likely 

due to medical non-compliance and missing dialysis, now that meds restarted BP 

down to normal.   





(5) Anemia of renal disease


Code(s): D63.1 - ANEMIA IN CHRONIC KIDNEY DISEASE   Status: Chronic   





(6) Chronic hepatitis C


Code(s): B18.2 - CHRONIC VIRAL HEPATITIS C   Status: Chronic   


Qualifiers: 


   Hepatic coma status: without hepatic coma 


Comment: stable   





(7) DM type 2 (diabetes mellitus, type 2)


Status: Chronic   


Qualifiers: 


   Diabetes mellitus long term insulin use: with long term use   Diabetes 

mellitus complication status: with kidney complications   Chronic kidney 

disease stage: on chronic dialysis 


Comment: holding insulin for now, bs well controlled   





(8) ESRD (end stage renal disease) on dialysis


Code(s): N18.6 - END STAGE RENAL DISEASE; Z99.2 - DEPENDENCE ON RENAL DIALYSIS 

  Status: Chronic   Comment: dialysis per nephrology   





(9) Gout


Code(s): M10.9 - GOUT, UNSPECIFIED   Status: Chronic   


Qualifiers: 


 


Comment: stable   





(10) Hypertension


Code(s): I10 - ESSENTIAL (PRIMARY) HYPERTENSION   Status: Chronic   


Qualifiers: 


 





(11) Secondary hyperparathyroidism of renal origin


Code(s): N25.81 - SECONDARY HYPERPARATHYROIDISM OF RENAL ORIGIN   Status: 

Chronic   





(12) Physical deconditioning


Code(s): R53.81 - OTHER MALAISE   Status: Acute   





- Plan


awaiting swing placement


-: pt refused to participate with PT yesterday


-: counselled regarding working with PT and to mobilize so he can go home [facility]


-: -y from swing bed/rehab.


-: is on coreg, procardia, hydralazine, imdur, asp and lipitor





* .








Review of Systems





- Medications/Allergies


Allergies/Adverse Reactions: 


 Allergies











Allergy/AdvReac Type Severity Reaction Status Date / Time


 


No Known Drug Allergies Allergy   Verified 02/07/19 00:42











Medications: 


 Current Medications





Acetaminophen (Tylenol)  650 mg PO Q4H PRN


   PRN Reason: Headache/Fever/Mild Pain (1-3)


   Last Admin: 02/11/19 20:28 Dose:  650 mg


Acetaminophen (Tylenol)  650 mg [place] Q4H PRN


   PRN Reason: Headache/Fever/Mild Pain (1-3)


Allopurinol (Zyloprim)  150 mg [facility]ILY [facility]


   Last Admin: 02/12/19 10:09 Dose:  Not Given


Aspirin (Ecotrin)  81 mg PO DAILY [facility]


   Last Admin: 02/12/19 10:08 Dose:  81 mg


Atorvastatin Calcium (Lipitor)  10 mg PO [facility]


   Last Admin: 02/11/19 20:24 Dose:  10 mg


Carvedilol (Coreg)  25 mg PO BID [facility]


   Last Admin: 02/12/19 10:07 Dose:  25 mg


Dextrose/Water (Dextrose 50%)  25 gm SLOW IVP PRN PRN


   PRN Reason: Hypoglycemia


Epoetin Fausto (Procrit)  7,500 units SC Q7D [facility]


   Last Admin: 02/11/19 15:57 Dose:  7,500 units


Ferrous Sulfate (Feosol)  325 mg PO QAM-[facility]


   Last Admin: 02/12/19 10:09 Dose:  Not Given


Fish Oil (Fish Oil)  1,000 mg PO BID [facility]


   Last Admin: 02/12/19 10:08 Dose:  Not Given


Glucagon (Glucagon)  1 mg IM PRN PRN


   PRN Reason: Hypoglycemia


Hydralazine HCl (Apresoline)  10 mg SLOW IVP Q4H PRN


   PRN Reason: Severe Hypertension


   Last Admin: 02/10/19 12:47 Dose:  10 mg


Hydralazine HCl (Apresoline)  75 mg PO TID [facility]


   Last Admin: 02/12/19 10:07 Dose:  75 mg


Dextrose/Water (D5w)  1,000 mls @ 0 mls/hr IV .Q0M PRN


   PRN Reason: Hypoglycemia


Insulin Human Lispro (Humalog)  0 units SC .MILD SLIDING SCALE PRN


   PRN Reason: Mild Correctional Scale


Insulin Human Lispro (Humalog)  0 units SC .BEDTIME SLIDING SC PRN


   PRN Reason: Bedtime Correctional Scale


Isosorbide Mononitrate (Imdur)  60 mg PO DAILY [facility]


   Last Admin: 02/12/19 10:07 Dose:  60 mg


Labetalol HCl (Normodyne)  10 mg SLOW IVP Q4H PRN


   PRN Reason: SBP Greater Than 180


Nifedipine (Procardia Xl)  30 mg PO DAILY [facility]


   Last Admin: 02/12/19 10:06 Dose:  30 mg


Ondansetron HCl (Zofran Odt)  4 mg PO Q6H PRN


   PRN Reason: Nausea/Vomiting


   Last Admin: 02/09/19 08:34 Dose:  4 mg


Ondansetron HCl (Zofran)  4 mg IVP Q6H PRN


   PRN Reason: Nausea/Vomiting


Pantoprazole Sodium (Protonix)  40 mg PO DAILY [facility]


   Last Admin: 02/12/19 10:07 Dose:  40 mg


Polyethylene Glycol (Miralax)  17 gm PO DAILY [facility]


   Last Admin: 02/12/19 10:08 Dose:  Not Given


Senna/Docusate Sodium (Senokot S)  2 tab PO BID PRN


   PRN Reason: Constipation


Sertraline HCl (Zoloft)  100 mg PO DAILY [facility]


   Last Admin: 02/12/19 10:07 Dose:  100 mg

## 2019-02-13 NOTE — PDOC.PN
- Subjective


Encounter Start Date: 02/13/19


Encounter Start Time: 10:30


Subjective: getting HD, is awake, no sob or palp





- Objective


MAR Reviewed: Yes


Vital Signs & Weight: 


 Vital Signs (12 hours)











  Temp Pulse Resp BP BP Pulse Ox


 


 02/13/19 08:00       91 L


 


 02/13/19 07:50  97.4 F L  71  16   190/87 H  91 L


 


 02/13/19 04:41     186/80 H  


 


 02/13/19 04:28  98.0 F  66  20   182/91 H  94 L


 


 02/13/19 00:43  98.0 F  63  20   164/73 H  94 L








 Weight











Weight                         169 lb 3 oz











 Most Recent Monitor Data











Heart Rate from ECG            57


 


NIBP                           122/57


 


NIBP BP-Mean                   78


 


Respiration from ECG           16














I&O: 


 











 02/12/19 02/13/19 02/14/19





 06:59 06:59 06:59


 


Intake Total 720 1380 


 


Balance 720 1380 











Result Diagrams: 


 02/13/19 08:40





 02/13/19 08:40


Additional Labs: 


 Accuchecks











  02/13/19 02/12/19 02/12/19





  05:45 21:05 15:49


 


POC Glucose  87  109  95














  02/12/19





  11:10


 


POC Glucose  88














Phys Exam





- Physical Examination


HEENT: PERRLA, moist MMs


Neck: no JVD, supple


Respiratory: no wheezing, no rales


Cardiovascular: RRR, no significant murmur


Gastrointestinal: soft, non-tender, positive bowel sounds


Musculoskeletal: no edema, pulses present


Neurological: non-focal, moves all 4 limbs


Psychiatric: normal affect, A&O x 3





Dx/Plan


(1) Acute metabolic encephalopathy


Code(s): G93.41 - METABOLIC ENCEPHALOPATHY   Status: Resolved   





(2) Hypokalemia


Code(s): E87.6 - HYPOKALEMIA   Status: Resolved   





(3) Hyperlipidemia


Code(s): E78.5 - HYPERLIPIDEMIA, UNSPECIFIED   Status: Chronic   


Qualifiers: 


   Hyperlipidemia type: unspecified   Qualified Code(s): E78.5 - Hyperlipidemia

, unspecified   





(4) Hypertensive emergency


Code(s): I16.1 - HYPERTENSIVE EMERGENCY   Status: Resolved   Comment: Likely 

due to medical non-compliance and missing dialysis, now that meds restarted BP 

down to normal.   





(5) Anemia of renal disease


Code(s): D63.1 - ANEMIA IN CHRONIC KIDNEY DISEASE   Status: Chronic   





(6) Chronic hepatitis C


Code(s): B18.2 - CHRONIC VIRAL HEPATITIS C   Status: Chronic   


Qualifiers: 


   Hepatic coma status: without hepatic coma 


Comment: stable   





(7) DM type 2 (diabetes mellitus, type 2)


Status: Chronic   


Qualifiers: 


   Diabetes mellitus long term insulin use: with long term use   Diabetes 

mellitus complication status: with kidney complications   Chronic kidney 

disease stage: on chronic dialysis 


Comment: holding insulin for now, bs well controlled   





(8) ESRD (end stage renal disease) on dialysis


Code(s): N18.6 - END STAGE RENAL DISEASE; Z99.2 - DEPENDENCE ON RENAL DIALYSIS 

  Status: Chronic   Comment: dialysis per nephrology   





(9) Gout


Code(s): M10.9 - GOUT, UNSPECIFIED   Status: Chronic   


Qualifiers: 


 


Comment: stable   





(10) Hypertension


Code(s): I10 - ESSENTIAL (PRIMARY) HYPERTENSION   Status: Chronic   


Qualifiers: 


 





(11) Secondary hyperparathyroidism of renal origin


Code(s): N25.81 - SECONDARY HYPERPARATHYROIDISM OF RENAL ORIGIN   Status: 

Chronic   





(12) Physical deconditioning


Code(s): R53.81 - OTHER MALAISE   Status: Acute   





(13) Depression


Code(s): F32.9 - MAJOR DEPRESSIVE DISORDER, SINGLE EPISODE, UNSPECIFIED   Status

: Acute   


Qualifiers: 


   Depression Type: major depressive disorder   Active/Remission status: 

currently active   Major depression episode severity: mild 





- Plan


hemostable


-: may dc anytime if placement is ready


-: continue coreg, procardia, hydralazine, imdur


-: fish oil, asp, lipitor


-: to amb as tolerated with PT





* .








Review of Systems





- Medications/Allergies


Allergies/Adverse Reactions: 


 Allergies











Allergy/AdvReac Type Severity Reaction Status Date / Time


 


No Known Drug Allergies Allergy   Verified 02/07/19 00:42











Medications: 


 Current Medications





Acetaminophen (Tylenol)  650 mg PO Q4H PRN


   PRN Reason: Headache/Fever/Mild Pain (1-3)


   Last Admin: 02/11/19 20:28 Dose:  650 mg


Acetaminophen (Tylenol)  650 mg IN Q4H PRN


   PRN Reason: Headache/Fever/Mild Pain (1-3)


Allopurinol (Zyloprim)  150 mg PO DAILY [facility]


   Last Admin: 02/12/19 10:09 Dose:  Not Given


Aspirin (Ecotrin)  81 mg PO DAILY [facility]


   Last Admin: 02/12/19 10:08 Dose:  81 mg


Atorvastatin Calcium (Lipitor)  10 mg PO HS [facility]


   Last Admin: 02/12/19 20:06 Dose:  10 mg


Carvedilol (Coreg)  25 mg PO BID [facility]


   Last Admin: 02/12/19 20:06 Dose:  25 mg


Clonidine (Catapres)  0.1 mg PO Q4H PRN


   PRN Reason: SBP>180


   Last Admin: 02/13/19 04:41 Dose:  0.1 mg


Dextrose/Water (Dextrose 50%)  25 gm SLOW IVP PRN PRN


   PRN Reason: Hypoglycemia


Epoetin Fausto (Procrit)  7,500 units SC Q7D [facility]


   Last Admin: 02/11/19 15:57 Dose:  7,500 units


Ferrous Sulfate (Feosol)  325 mg PO QA-[facility]


   Last Admin: 02/12/19 10:09 Dose:  Not Given


Fish Oil (Fish Oil)  1,000 mg PO BID [facility]


   Last Admin: 02/12/19 20:06 Dose:  1,000 mg


Glucagon (Glucagon)  1 mg IM PRN PRN


   PRN Reason: Hypoglycemia


Hydralazine HCl (Apresoline)  10 mg SLOW IVP Q4H PRN


   PRN Reason: Severe Hypertension


   Last Admin: 02/10/19 12:47 Dose:  10 mg


Hydralazine HCl (Apresoline)  100 mg PO TID [facility]


Dextrose/Water (D5w)  1,000 mls @ 0 mls/hr IV .Q0M PRN


   PRN Reason: Hypoglycemia


Insulin Human Lispro (Humalog)  0 units SC .MILD SLIDING SCALE PRN


   PRN Reason: Mild Correctional Scale


Insulin Human Lispro (Humalog)  0 units SC .BEDTIME SLIDING SC PRN


   PRN Reason: Bedtime Correctional Scale


Isosorbide Mononitrate (Imdur)  60 mg PO DAILY [facility]


   Last Admin: 02/12/19 10:07 Dose:  60 mg


Labetalol HCl (Normodyne)  10 mg SLOW IVP Q4H PRN


   PRN Reason: SBP Greater Than 180


Nifedipine (Procardia Xl)  30 mg PO DAILY [facility]


   Last Admin: 02/12/19 10:06 Dose:  30 mg


Ondansetron HCl (Zofran Odt)  4 mg PO Q6H PRN


   PRN Reason: Nausea/Vomiting


   Last Admin: 02/09/19 08:34 Dose:  4 mg


Ondansetron HCl (Zofran)  4 mg IVP Q6H PRN


   PRN Reason: Nausea/Vomiting


Pantoprazole Sodium (Protonix)  40 mg PO DAILY [facility]


   Last Admin: 02/12/19 10:07 Dose:  40 mg


Polyethylene Glycol (Miralax)  17 gm PO DAILY [facility]


   Last Admin: 02/12/19 10:08 Dose:  Not Given


Senna/Docusate Sodium (Senokot S)  2 tab PO BID PRN


   PRN Reason: Constipation


Sertraline HCl (Zoloft)  100 mg PO DAILY [facility]


   Last Admin: 02/12/19 10:07 Dose:  100 mg

## 2019-02-13 NOTE — PRG
DATE OF SERVICE:  02/13/2019



SERVICE:  Renal Medicine.



SUBJECTIVE:  Mr. Scott is a 58-year-old black male with ESRD and on maintenance

hemodialysis.  He is currently dialyzing this morning.  I am at the bedside

supervising his dialysis.  He feels tired today.  He voices no other complaints.  He

denies any chest pain or shortness of breath. 



OBJECTIVE:  VITAL SIGNS:  Blood pressure is 190/87, heart rate 71, respiratory rate

16, temperature 97.4, and pulse ox 91%. 

GENERAL:  Awake, alert, comfortable, not in overt distress. 

SKIN:  Adequate turgor. 

HEENT:  He has pinkish conjunctivae.  Anicteric sclerae. 

NECK:  No neck mass.  No carotid bruits.  No JVD. 

CHEST:  No deformities. 

LUNGS:  Clear breath sounds. 

HEART:  Normal sinus rhythm.  No murmur.  No gallops.  No rubs. 

ABDOMEN:  Globular, soft, nontender.  No masses. 

EXTREMITIES:  No edema.  No deformities.



MEDICATIONS:  Medications of February 13, 2019, was reviewed.



LABORATORY DATA:  Laboratories of February 11, 2019; white count 4.8, hemoglobin

10.9. 



On February 9, 2019; BUN 18, creatinine 3.26.



ASSESSMENT AND PLAN:  

1. End-stag renal disease.  The patient requested to shorten his dialysis treatment.

 I will do a 3-hour hemodialysis today and then bring him back to 3-1/2 hours on

Monday, Wednesday, and Friday.  Fluid removal only as tolerated. 

2. Anemia, on weekly Epogen.

3. Hypertension - we will increase his hydralazine to 100 mg tablet t.i.d. 



Awaiting nursing home placement.







Job ID:  [id number]

## 2019-02-14 NOTE — PDOC.PN
- Subjective


Encounter Start Date: 02/14/19


Encounter Start Time: 07:00


Subjective: awake, oriented well, no sob


-: says he will participate with PT today





- Objective


MAR Reviewed: Yes


Vital Signs & Weight: 


 Vital Signs (12 hours)











  Temp Pulse Resp BP BP Pulse Ox


 


 02/14/19 10:58  97.2 F L  55 L  18   183/69 H  95


 


 02/14/19 08:48   65   176/84 H  


 


 02/14/19 08:47   65   176/84 H  


 


 02/14/19 08:05  97.8 F  65  16   176/84 H  96


 


 02/14/19 08:00       96


 


 02/14/19 04:16  97.3 F L  66  18   177/86 H  94 L


 


 02/14/19 00:10  98.5 F  64  18   173/76 H  94 L








 Weight











Weight                         162 lb 4.163 oz











 Most Recent Monitor Data











Heart Rate from ECG            57


 


NIBP                           122/57


 


NIBP BP-Mean                   78


 


Respiration from ECG           16














I&O: 


 











 02/13/19 02/14/19 02/15/19





 06:59 06:59 06:59


 


Intake Total 1380  


 


Balance 1380  











Result Diagrams: 


 02/13/19 08:40





 02/13/19 08:40


Additional Labs: 


 Accuchecks











  02/14/19 02/14/19 02/13/19





  10:54 05:45 20:55


 


POC Glucose  133 H  77  99














  02/13/19 02/13/19 02/13/19





  16:41 12:43 12:18


 


POC Glucose  105  96  63 L














Phys Exam





- Physical Examination


HEENT: PERRLA, moist MMs


Neck: no JVD, supple


Respiratory: no wheezing, no rales


Cardiovascular: RRR, no significant murmur


Gastrointestinal: soft, non-tender, positive bowel sounds


Musculoskeletal: no edema, pulses present


Neurological: non-focal, moves all 4 limbs


Psychiatric: normal affect, A&O x 3





Dx/Plan


(1) Acute metabolic encephalopathy


Code(s): G93.41 - METABOLIC ENCEPHALOPATHY   Status: Resolved   





(2) Hypokalemia


Code(s): E87.6 - HYPOKALEMIA   Status: Resolved   





(3) Hyperlipidemia


Code(s): E78.5 - HYPERLIPIDEMIA, UNSPECIFIED   Status: Chronic   


Qualifiers: 


   Hyperlipidemia type: unspecified   Qualified Code(s): E78.5 - Hyperlipidemia

, unspecified   





(4) Hypertensive emergency


Code(s): I16.1 - HYPERTENSIVE EMERGENCY   Status: Resolved   Comment: Likely 

due to medical non-compliance and missing dialysis, now that meds restarted BP 

down to normal.   





(5) Anemia of renal disease


Code(s): D63.1 - ANEMIA IN CHRONIC KIDNEY DISEASE   Status: Chronic   





(6) Chronic hepatitis C


Code(s): B18.2 - CHRONIC VIRAL HEPATITIS C   Status: Chronic   


Qualifiers: 


   Hepatic coma status: without hepatic coma 


Comment: stable   





(7) DM type 2 (diabetes mellitus, type 2)


Status: Chronic   


Qualifiers: 


   Diabetes mellitus long term insulin use: with long term use   Diabetes 

mellitus complication status: with kidney complications   Chronic kidney 

disease stage: on chronic dialysis 


Comment: holding insulin for now, bs well controlled   





(8) ESRD (end stage renal disease) on dialysis


Code(s): N18.6 - END STAGE RENAL DISEASE; Z99.2 - DEPENDENCE ON RENAL DIALYSIS 

  Status: Chronic   Comment: dialysis per nephrology   





(9) Gout


Code(s): M10.9 - GOUT, UNSPECIFIED   Status: Chronic   


Qualifiers: 


 


Comment: stable   





(10) Hypertension


Code(s): I10 - ESSENTIAL (PRIMARY) HYPERTENSION   Status: Chronic   


Qualifiers: 


 





(11) Secondary hyperparathyroidism of renal origin


Code(s): N25.81 - SECONDARY HYPERPARATHYROIDISM OF RENAL ORIGIN   Status: 

Chronic   





(12) Physical deconditioning


Code(s): R53.81 - OTHER MALAISE   Status: Acute   





(13) Depression


Code(s): F32.9 - MAJOR DEPRESSIVE DISORDER, SINGLE EPISODE, UNSPECIFIED   Status

: Acute   


Qualifiers: 


   Depression Type: major depressive disorder   Active/Remission status: 

currently active   Major depression episode severity: mild 





- Plan


hemostable


-: needs to mobilize more with PT, has not amb so far


-: continue asp, lipitor, coreg, hydralazine, imdur and zoloft


-: awaiting placement, may dc if ready





* .








Review of Systems





- Medications/Allergies


Allergies/Adverse Reactions: 


 Allergies











Allergy/AdvReac Type Severity Reaction Status Date / Time


 


No Known Drug Allergies Allergy   Verified 02/07/19 00:42











Medications: 


 Current Medications





Acetaminophen (Tylenol)  650 mg PO Q4H PRN


   PRN Reason: Headache/Fever/Mild Pain (1-3)


   Last Admin: 02/11/19 20:28 Dose:  650 mg


Acetaminophen (Tylenol)  650 mg [place] Q4H PRN


   PRN Reason: Headache/Fever/Mild Pain (1-3)


Allopurinol (Zyloprim)  150 mg PO DAILY [facility]


   Last Admin: 02/14/19 08:45 Dose:  150 mg


Aspirin (Ecotrin)  81 mg PO DAILY [facility]


   Last Admin: 02/14/19 08:47 Dose:  81 mg


Atorvastatin Calcium (Lipitor)  10 mg PO HS [facility]


   Last Admin: 02/13/19 20:27 Dose:  10 mg


Carvedilol (Coreg)  25 mg PO BID [facility]


   Last Admin: 02/14/19 08:47 Dose:  25 mg


Clonidine (Catapres)  0.1 mg PO Q4H PRN


   PRN Reason: SBP>180


   Last Admin: 02/13/19 20:28 Dose:  0.1 mg


Dextrose/Water (Dextrose 50%)  25 gm SLOW IVP PRN PRN


   PRN Reason: Hypoglycemia


Epoetin Fausto (Procrit)  7,500 units SC Q7D [facility]


   Last Admin: 02/11/19 15:57 Dose:  7,500 units


Ferrous Sulfate (Feosol)  325 mg PO QAM-[facility]


   Last Admin: 02/14/19 08:46 Dose:  325 mg


Fish Oil (Fish Oil)  1,000 mg PO BID [facility]


   Last Admin: 02/14/19 08:47 Dose:  1,000 mg


Glucagon (Glucagon)  1 mg IM PRN PRN


   PRN Reason: Hypoglycemia


Hydralazine HCl (Apresoline)  10 mg SLOW IVP Q4H PRN


   PRN Reason: Severe Hypertension


   Last Admin: 02/10/19 12:47 Dose:  10 mg


Hydralazine HCl (Apresoline)  100 mg PO TID [facility]


   Last Admin: 02/14/19 08:48 Dose:  100 mg


Dextrose/Water (D5w)  1,000 mls @ 0 mls/hr IV .Q0M PRN


   PRN Reason: Hypoglycemia


Insulin Human Lispro (Humalog)  0 units SC .MILD SLIDING SCALE PRN


   PRN Reason: Mild Correctional Scale


Insulin Human Lispro (Humalog)  0 units SC .BEDTIME SLIDING SC PRN


   PRN Reason: Bedtime Correctional Scale


Isosorbide Mononitrate (Imdur)  60 mg PO DAILY [facility]


   Last Admin: 02/14/19 08:46 Dose:  60 mg


Labetalol HCl (Normodyne)  10 mg SLOW IVP Q4H PRN


   PRN Reason: SBP Greater Than 180


Nifedipine (Procardia Xl)  30 mg PO DAILY [facility]


   Last Admin: 02/14/19 08:47 Dose:  30 mg


Ondansetron HCl (Zofran Odt)  4 mg PO Q6H PRN


   PRN Reason: Nausea/Vomiting


   Last Admin: 02/09/19 08:34 Dose:  4 mg


Ondansetron HCl (Zofran)  4 mg IVP Q6H PRN


   PRN Reason: Nausea/Vomiting


Pantoprazole Sodium (Protonix)  40 mg PO DAILY [facility]


   Last Admin: 02/14/19 08:46 Dose:  40 mg


Polyethylene Glycol (Miralax)  17 gm PO DAILY [facility]


   Last Admin: 02/14/19 08:51 Dose:  Not Given


Senna/Docusate Sodium (Senokot S)  2 tab PO BID PRN


   PRN Reason: Constipation


Sertraline HCl (Zoloft)  100 mg PO DAILY [facility]


   Last Admin: 02/14/19 08:45 Dose:  100 mg

## 2019-02-15 NOTE — DIS
DATE OF ADMISSION:  02/06/2019



DATE OF DISCHARGE:  02/15/2019



PRIMARY CARE PROVIDER:  [facility].



DISCHARGE DIAGNOSES:  

1. Acute metabolic encephalopathy.

2. Hypertensive emergency.

3. Hypokalemia.

4. Mild depression, suspected.



CONDITION OF PATIENT ON THE DAY OF DISCHARGE:  Stable. 



I assessed Mr. Scott on the day of discharge.  He denies any chest pain or shortness

of breath.  Vital signs are stable.  S1 and S2 are heard, regular.  Lungs are clear

to auscultation bilaterally. 



DISCHARGE MEDICATIONS:  

1. Aspirin 81 mg daily.

2. Esomeprazole 40 mg daily.

3. Lantus insulin 80 units subcutaneously every morning.

4. Omega-3 fatty acids 1000 mg daily.

5. Allopurinol 150 mg daily.

6. Lipitor 10 mg at bedtime.

7. Coreg 25 mg 2 times a day.

8. Ferrous sulfate 325 mg daily.

9. Hydralazine 100 mg 3 times a day.

10. Isosorbide mononitrate 60 mg daily.

11. Procardia XL 30 mg daily.

12. MiraLAX 17 g daily.

13. Zoloft 100 mg daily.

14. Procrit 7500 units every week.



HOSPITAL COURSE:  Mr. Scott is a pleasant 58-year-old gentleman, who was admitted to

[facility] on February 6, 2019, for acute metabolic

encephalopathy, likely secondary to uremia.  He was found down on the floor in his

bathroom by his .  Please refer to Dr. Segura's history and physical note dated

February 6, 2019, for further details. 



He was seen by [facility]ph[facility].  He underwent maintenance hemodialysis.  He

slowly started improving. 



At the time of admission, he was also in hypertensive emergency.  This improved with

adjustment of his blood pressure medications. 



After further discussions, the patient agreed to go to skilled nursing facility till

he was able to manage at home. 



He was also started on antidepressants for suspected mild depression. 



He is being discharged to [facility] for further management. 



Many thanks for allowing me to participate in your patient's care.  Please feel free

to contact me with any questions or concerns. 



DISCHARGE DESTINATION:  [facility].



TOTAL AMOUNT OF TIME SPENT COORDINATING THIS DISCHARGE:  33 minutes.







Job ID:  [id number]

## 2019-02-15 NOTE — PRG
DATE OF SERVICE:  02/15/2019



SUBJECTIVE:  A 58-year-old male with multiple comorbidities including end-stage

renal disease, on maintenance hemodialysis, who was admitted after he was found down

and he is now confused.  Mental status has improved to baseline with supportive care

and hemodialysis.  The patient has no new complaints. 



OBJECTIVE:  VITAL SIGNS:  Blood pressure 156/65, pulse 60, respiratory rate 18, SpO2

of 92% on room air, and temperature 97.3. 

GENERAL:  Elderly male in no obvious distress.  Afebrile, anicteric, acyanotic. 

HEENT:  Normocephalic and atraumatic. 

CHEST:  Good air entry bilaterally with no obvious crackles. 

CARDIAC:  Regular rhythm and rate with normal heart sounds. 

ABDOMEN:  Full, soft, nontender, and nondistended with normal bowel sounds. 

EXTREMITIES:  Grossly normal with no deformity or edema. 

NEUROLOGIC:  Conscious and alert conversation.  Cranial nerves II through XII are

grossly intact.  The patient moves all extremities, but weakly. 



DIAGNOSTIC DATA:  BMP of February 13th showed sodium of 137, potassium of 4.8, CO2

of 25, and creatinine of 4.16. 



ASSESSMENT AND PLAN:  

1. End-stage renal disease, on maintenance hemodialysis Monday, Wednesday, and

Friday.  The patient has been tolerating hemodialysis with fluid removal as

tolerated.  He was seen on dialysis unit and dialysis permits the treatment.  Next

dialysis will be on Monday, but he can be discharged to his nursing facility to

resume outpatient dialysis regimen. 

2. Accelerated hypertension:  Present on admission.  This most likely is related to

medication noncompliance due to mental status change as well as fluid overload.

This has improved to acceptable numbers.  We will continue on current

antihypertensives. 

3. Acute encephalopathy:  May be related to uremic syndrome.  Arrhythmia cannot be

ruled out as well. 



DISPOSITION:  The patient can be discharged from nephrology point of view.  He is to

resume outpatient dialysis regimen, Monday, Wednesday, and Friday. 







Job ID:  [id number]